# Patient Record
Sex: MALE | Race: WHITE | NOT HISPANIC OR LATINO | Employment: UNEMPLOYED | URBAN - METROPOLITAN AREA
[De-identification: names, ages, dates, MRNs, and addresses within clinical notes are randomized per-mention and may not be internally consistent; named-entity substitution may affect disease eponyms.]

---

## 2019-09-12 ENCOUNTER — TELEPHONE (OUTPATIENT)
Dept: NEUROLOGY | Facility: CLINIC | Age: 29
End: 2019-09-12

## 2019-09-12 NOTE — TELEPHONE ENCOUNTER
Left message regarding patient appointment on 10/1/2019 with Eboni Mckenzie    Please confirm with patient when call is return if they received NP packet, also please verify if patient had any former imaging,labs, EEG done or a  Neurologist they had seen in the past

## 2019-09-13 NOTE — TELEPHONE ENCOUNTER
Spoke to [employer] (mom) who explained that patient doesn't have h/o of seizures  Patient had a head injury years ago and would like another imaging done to make sure nothing is going on  Yuval Searcy is aware

## 2019-09-25 ENCOUNTER — TRANSCRIBE ORDERS (OUTPATIENT)
Dept: NEUROLOGY | Facility: CLINIC | Age: 29
End: 2019-09-25

## 2019-09-25 DIAGNOSIS — G40.901: Primary | ICD-10-CM

## 2019-09-25 DIAGNOSIS — F29 PSYCHOSIS NOT DUE TO SUBSTANCE OR KNOWN PHYSIOLOGICAL CONDITION (HCC): ICD-10-CM

## 2019-09-27 NOTE — PROGRESS NOTES
Patient ID: Alyssa Terry is a 26 y o  male with schizoaffective disorder with visual, auditory, and tactile hallucinations, who is presenting to Neurology office for evaluation of his hallucinations  Assessment/Plan:    Transient neurological symptoms  He has been having multiple neurologic symptoms including visual/auditory/tactile hallucinations  I discussed that his is likely related to his underlying psychiatric disease, but I cannot fully exclude the possibility of a neurologic cause, such as seizures without any additional testing  He has not been evaluated with an MRI in the past and has not had an EEG      -- to evaluate for the possibility of a structural lesion causing his symptoms and for seizures, I will have him get an MRI of his brain and an EEG  If these are normal, I do not feel any additional testing would be necessary  Schizoaffective disorder ([address] )  His mood and behaviors have been much better controlled with being on medications  He is having some cognitive slowing/side effects with his medications  I discussed that especially given how he was having difficulty with being violent, I would recommend that he discuss these symptoms with his psychiatrist          He will return to the office in about 3 months  Subjective:    HPI  Current medications as per Epic (not on any seizure medications)    Briefly reviewing his history, he has an extensive history of illicit substance abuse, initially abusing heroin, but later changing to methamphetamine  He also did abuse benzodiazepines in the past   Starting about 3 years ago, he started to have auditory hallucinations of hearing loud noises, that then changed to be voices  He also started to see movement and later formed visual hallucinations  Additionally he then started to have some tactile feelings such as bugs crawling on him or pushing on his head    Over the years, he has followed with a psychiatrist and has been on multiple medications  These medications have been helpful in decreasing his hallucinations, but they still occur  He also has had significant difficulty with violence and behavioral issues  This had escalated in the past where his family needed to call the police and have him incarcerated because of his symptoms  This violent behavior has overall improved significantly since he has been started on his multiple antipsychotic medications  In addition to his hallucinations, he does have significant difficulty with his thinking, feeling more cognitively slow  He also has some episodes where he will have brief twitching of his body, which is most common when he is falling asleep at night  He denies any larger definite seizures  He has not had any episodes where he has fully lost consciousness  He has "blacked out" during periods of time when he was in a "rage" and very violent, but this has not occurred in a long time since he has been on medications  Special Features  Status epilepticus: no  Self Injury Seizures: no  Precipitating Factors: none    Epilepsy Risk Factors:  Uncomplicated pregnancy with normal development  No learning disabilities or cognitive delay  No h/o febrile seizures, CNS infections, strokes, or CNS neoplasms  There is no family history of seizures or epilepsy  Prior AEDs:  None for seizures    Prior Evaluation:  - MRI brain: none  - Routine EEG: none  - Video EEG: none    Psychiatric History:  Depression: yes  Anxiety: yes  Psychosis: yes  Psychiatric Admissions: Yes    His history was also obtained from his mother, who was present at today's visit  I reviewed prior notes including hospital notes, as documented in Epic/[employer], and summarized above      The following portions of the patient's history were reviewed and updated as appropriate: allergies, current medications, past family history, past medical history, past social history, past surgical history and problem list  Objective:    Blood pressure 132/84, pulse 100, height 5' 11" (1 803 m), weight 89 4 kg (197 lb)  Physical Exam    Neurological Exam  GENERAL EXAMINATION:   In general patient is well appearing and in no distress  There is no peripheral edema  NEUROLOGIC EXAMINATION:     Alert and oriented to person, date, location  Fund of knowledge is full with good understanding of medical situation  Recent and remote memory were intact    Mood and affect are appropriate  Attention is intact  Language function including fluency, naming, and comprehension intact  Cranial nerves: Pupils are equal round reactive to light and accommodation  Visual Fields are full to confrontation bilaterally  Optic discs are sharp with no evidence of papilledema Extraocular movements are intact without nystagmus  Facial sensation is intact to light touch  No facial droop, face activates symmetrically  There is no dysarthria  Hearing was intact to finger rub  Tongue and uvula are midline and palate elevates symmetrically  Shoulder shrug  5/5  Motor Exam:  No pronator drift  Bulk and tone are normal  Strength is 5/5 throughout  Deep tendon reflexes: Biceps 2+, brachioradialis 2+, patellar 2+, Achilles 2+ bilaterally  Negative Madrids     Sensation: Intact light touch    Coordination: Finger nose finger and heel to shin testing are without dysmetria  Gait: Negative romberg  Normal casual gait  ROS:    Review of Systems   Constitutional: Positive for fatigue  Negative for appetite change and fever  HENT: Positive for tinnitus  Negative for hearing loss, trouble swallowing and voice change  Eyes: Negative  Negative for photophobia and pain  Respiratory: Negative  Negative for shortness of breath  Cardiovascular: Negative  Negative for palpitations  Gastrointestinal: Negative  Negative for nausea and vomiting  Endocrine: Negative  Negative for cold intolerance and heat intolerance     Genitourinary: Negative  Negative for dysuria, frequency and urgency  Musculoskeletal: Negative  Negative for myalgias and neck pain  Skin: Negative  Negative for rash  Allergic/Immunologic: Negative  Neurological: Negative for dizziness, tremors, seizures, syncope, facial asymmetry, speech difficulty, weakness, light-headedness, numbness and headaches  Trouble falling asleep  Memory problems   Hematological: Negative  Does not bruise/bleed easily  Psychiatric/Behavioral: Negative for confusion, hallucinations and sleep disturbance          Anxiety  Depression         I personally reviewed the ROS that was entered by the medical assistant

## 2019-10-01 ENCOUNTER — CONSULT (OUTPATIENT)
Dept: NEUROLOGY | Facility: CLINIC | Age: 29
End: 2019-10-01
Payer: COMMERCIAL

## 2019-10-01 VITALS
HEART RATE: 100 BPM | DIASTOLIC BLOOD PRESSURE: 84 MMHG | HEIGHT: 71 IN | WEIGHT: 197 LBS | SYSTOLIC BLOOD PRESSURE: 132 MMHG | BODY MASS INDEX: 27.58 KG/M2

## 2019-10-01 DIAGNOSIS — F29 PSYCHOSIS NOT DUE TO SUBSTANCE OR KNOWN PHYSIOLOGICAL CONDITION (HCC): ICD-10-CM

## 2019-10-01 DIAGNOSIS — G40.901: ICD-10-CM

## 2019-10-01 DIAGNOSIS — F25.9 SCHIZOAFFECTIVE DISORDER, UNSPECIFIED TYPE (HCC): Primary | ICD-10-CM

## 2019-10-01 DIAGNOSIS — R29.818 TRANSIENT NEUROLOGICAL SYMPTOMS: ICD-10-CM

## 2019-10-01 DIAGNOSIS — R44.3 HALLUCINATIONS: ICD-10-CM

## 2019-10-01 PROBLEM — S43.006A DISLOCATION, SHOULDER: Status: ACTIVE | Noted: 2019-10-01

## 2019-10-01 PROBLEM — E78.00 HIGH CHOLESTEROL: Status: ACTIVE | Noted: 2019-10-01

## 2019-10-01 PROCEDURE — 99245 OFF/OP CONSLTJ NEW/EST HI 55: CPT | Performed by: PSYCHIATRY & NEUROLOGY

## 2019-10-01 RX ORDER — LURASIDONE HYDROCHLORIDE 40 MG/1
TABLET, FILM COATED ORAL
Refills: 0 | COMMUNITY
Start: 2019-09-23 | End: 2021-02-11 | Stop reason: HOSPADM

## 2019-10-01 RX ORDER — BUPROPION HYDROCHLORIDE 300 MG/1
TABLET ORAL DAILY
Refills: 0 | COMMUNITY
Start: 2019-09-16 | End: 2021-07-09 | Stop reason: HOSPADM

## 2019-10-01 RX ORDER — ARIPIPRAZOLE 15 MG/1
15 TABLET ORAL DAILY
Refills: 0 | COMMUNITY
Start: 2019-09-06 | End: 2021-02-11 | Stop reason: HOSPADM

## 2019-10-01 RX ORDER — ATORVASTATIN CALCIUM 10 MG/1
10 TABLET, FILM COATED ORAL DAILY
COMMUNITY

## 2019-10-01 RX ORDER — BUSPIRONE HYDROCHLORIDE 15 MG/1
TABLET ORAL 3 TIMES DAILY
COMMUNITY
Start: 2018-07-16 | End: 2021-07-09 | Stop reason: HOSPADM

## 2019-10-11 LAB
BUN SERPL-MCNC: 14 MG/DL (ref 6–20)
CREAT SERPL-MCNC: 1.24 MG/DL (ref 0.76–1.27)
SL AMB EGFR AFRICAN AMERICAN: 90 ML/MIN/1.73
SL AMB EGFR NON AFRICAN AMERICAN: 78 ML/MIN/1.73

## 2019-10-14 NOTE — ASSESSMENT & PLAN NOTE
He has been having multiple neurologic symptoms including visual/auditory/tactile hallucinations  I discussed that his is likely related to his underlying psychiatric disease, but I cannot fully exclude the possibility of a neurologic cause, such as seizures without any additional testing  He has not been evaluated with an MRI in the past and has not had an EEG      -- to evaluate for the possibility of a structural lesion causing his symptoms and for seizures, I will have him get an MRI of his brain and an EEG  If these are normal, I do not feel any additional testing would be necessary

## 2019-10-14 NOTE — ASSESSMENT & PLAN NOTE
His mood and behaviors have been much better controlled with being on medications  He is having some cognitive slowing/side effects with his medications   I discussed that especially given how he was having difficulty with being violent, I would recommend that he discuss these symptoms with his psychiatrist

## 2019-10-22 ENCOUNTER — HOSPITAL ENCOUNTER (OUTPATIENT)
Dept: NEUROLOGY | Facility: HOSPITAL | Age: 29
Discharge: HOME | End: 2019-10-22
Attending: PSYCHIATRY & NEUROLOGY
Payer: COMMERCIAL

## 2019-10-22 DIAGNOSIS — R29.818 TRANSIENT NEUROLOGICAL SYMPTOMS: ICD-10-CM

## 2019-10-22 DIAGNOSIS — F29 PSYCHOSIS NOT DUE TO SUBSTANCE OR KNOWN PHYSIOLOGICAL CONDITION (HCC): ICD-10-CM

## 2019-10-22 DIAGNOSIS — R44.3 HALLUCINATIONS: ICD-10-CM

## 2019-10-22 DIAGNOSIS — F25.9 SCHIZOAFFECTIVE DISORDER, UNSPECIFIED TYPE (HCC): ICD-10-CM

## 2019-10-22 PROCEDURE — 95819 EEG AWAKE AND ASLEEP: CPT | Performed by: PSYCHIATRY & NEUROLOGY

## 2019-10-22 PROCEDURE — 95816 EEG AWAKE AND DROWSY: CPT

## 2020-01-06 RX ORDER — TRAZODONE HYDROCHLORIDE 50 MG/1
100 TABLET ORAL DAILY
Refills: 0 | COMMUNITY
Start: 2019-10-10 | End: 2021-07-09 | Stop reason: HOSPADM

## 2020-01-06 NOTE — PROGRESS NOTES
Patient ID: Argenis Choi is a 26 y o  male with schizoaffective disorder with visual, auditory, and tactile hallucinations, who is returning to Neurology office for follow up of his hallucinations  Assessment/Plan:    Hallucinations  As noted previously, his lucent lesions appear to be a manifestation of his schizoaffective disorder  His EEG did not show any evidence of seizures  The very nature of his symptoms, would be very atypical for seizures  Additionally, these have improved with adjustments in his psychiatric medications  It would be nice to be able to evaluate with an MRI of his brain, but this is likely not necessary to confirm his diagnosis  Will be most important for him to continue to establish care with a psychiatrist for ongoing management of his symptoms  Schizoaffective disorder ([facility] [address] )  As noted above, he is having some ongoing symptoms consistent with his schizoaffective disorder, and will be most important for him to follow with a psychiatrist for ongoing management  Memory loss  He has been having difficulty with poor memory  I discussed that the etiology of his memory difficulty is likely multifactorial, but largely likely tied to his schizoaffective disorder  Will be important to optimize his medications to improve his mood  I will check some blood work to assure there is no other cause of his memory difficulty  --I will have him get a B12, methylmalonic acid, TSH, and RPR checked        He will return to the office in about 6 months  Subjective:    HPI  Current medications as per Epic (not currently taking any seizure medications)  I last saw him in the office on 10/1/2019  At that time, he was seen as initial visit for evaluation of his hallucinations and to evaluate if there is any neurologic cause of his symptoms  To better characterize his episodes, he was plan on getting an MRI of his brain and a routine EEG      Since his last visit, he did get his routine EEG which was normal   He was not able to get his MRI due to insurance not approving the study  He is currently in the process of appealing the approval of his MRI  He has continued to follow with his primary care doctor for ongoing management of his schizoaffective disorder  He did have some adjustments of his medications, and is planned to establish with a new psychiatrist next week  He continues to have hallucinations, but no longer is hearing voices  He typically will hear a hissing or clicking sound, but if he talks to the hallucinations it will "talk back", baby is not able to make out the words  He does still have tactile hallucinations as if someone is pushing him and will see visual hallucinations of images in the peripheral vision, but this is not well formed and he is no longer seeing people  He feels that his mood is flat but good  He feels that his mood has been a little bit better  He has continued to have difficulty with his memory, noting very poor short-term memory  Prior Seizure Medications: none    His history was also obtained from his father, who was present at today's visit  The following portions of the patient's history were reviewed and updated as appropriate: allergies, current medications, past medical history and problem list      Objective:    Blood pressure 130/70, pulse 84, height 5' 11" (1 803 m), weight 93 kg (205 lb)  Physical Exam    Neurological Exam      ROS:    Review of Systems   Constitutional: Negative  Negative for appetite change and fever  HENT: Negative  Negative for hearing loss, tinnitus, trouble swallowing and voice change  Eyes: Negative  Negative for photophobia and pain  Respiratory: Negative  Negative for shortness of breath  Cardiovascular: Negative  Negative for palpitations  Gastrointestinal: Negative  Negative for nausea and vomiting  Endocrine: Negative  Negative for cold intolerance and heat intolerance  Genitourinary: Negative  Negative for dysuria, frequency and urgency  Musculoskeletal: Negative  Negative for myalgias and neck pain  Skin: Negative  Negative for rash  Neurological: Negative for dizziness, tremors, seizures, syncope, facial asymmetry, speech difficulty, weakness, light-headedness, numbness and headaches  Memory and concentration issues     Hematological: Negative  Does not bruise/bleed easily  Psychiatric/Behavioral: Positive for hallucinations  Negative for confusion and sleep disturbance         I personally reviewed the ROS that was entered by the medical assistant

## 2020-01-07 ENCOUNTER — OFFICE VISIT (OUTPATIENT)
Dept: NEUROLOGY | Facility: CLINIC | Age: 30
End: 2020-01-07
Payer: COMMERCIAL

## 2020-01-07 VITALS
SYSTOLIC BLOOD PRESSURE: 130 MMHG | WEIGHT: 205 LBS | DIASTOLIC BLOOD PRESSURE: 70 MMHG | BODY MASS INDEX: 28.7 KG/M2 | HEIGHT: 71 IN | HEART RATE: 84 BPM

## 2020-01-07 DIAGNOSIS — R41.3 MEMORY LOSS: ICD-10-CM

## 2020-01-07 DIAGNOSIS — F25.9 SCHIZOAFFECTIVE DISORDER, UNSPECIFIED TYPE (HCC): ICD-10-CM

## 2020-01-07 DIAGNOSIS — R44.3 HALLUCINATIONS: Primary | ICD-10-CM

## 2020-01-07 PROCEDURE — 99214 OFFICE O/P EST MOD 30 MIN: CPT | Performed by: PSYCHIATRY & NEUROLOGY

## 2020-01-07 RX ORDER — BUPROPION HYDROCHLORIDE 150 MG/1
150 TABLET ORAL DAILY
COMMUNITY
End: 2021-02-07 | Stop reason: SDUPTHER

## 2020-01-07 NOTE — PATIENT INSTRUCTIONS
-- I would like you to get some bloodwork to look into your memory  -- continue to work with your psychiatrist to try to improve your memory and hallucinations

## 2020-01-08 NOTE — ASSESSMENT & PLAN NOTE
As noted above, he is having some ongoing symptoms consistent with his schizoaffective disorder, and will be most important for him to follow with a psychiatrist for ongoing management

## 2020-01-08 NOTE — ASSESSMENT & PLAN NOTE
As noted previously, his lucent lesions appear to be a manifestation of his schizoaffective disorder  His EEG did not show any evidence of seizures  The very nature of his symptoms, would be very atypical for seizures  Additionally, these have improved with adjustments in his psychiatric medications  It would be nice to be able to evaluate with an MRI of his brain, but this is likely not necessary to confirm his diagnosis  Will be most important for him to continue to establish care with a psychiatrist for ongoing management of his symptoms

## 2020-01-08 NOTE — ASSESSMENT & PLAN NOTE
He has been having difficulty with poor memory  I discussed that the etiology of his memory difficulty is likely multifactorial, but largely likely tied to his schizoaffective disorder  Will be important to optimize his medications to improve his mood  I will check some blood work to assure there is no other cause of his memory difficulty      --I will have him get a B12, methylmalonic acid, TSH, and RPR checked

## 2020-07-25 ENCOUNTER — HOSPITAL ENCOUNTER (EMERGENCY)
Facility: HOSPITAL | Age: 30
Discharge: HOME | End: 2020-07-25
Attending: EMERGENCY MEDICINE
Payer: COMMERCIAL

## 2020-07-25 VITALS
HEART RATE: 81 BPM | SYSTOLIC BLOOD PRESSURE: 133 MMHG | RESPIRATION RATE: 18 BRPM | OXYGEN SATURATION: 98 % | BODY MASS INDEX: 22.32 KG/M2 | DIASTOLIC BLOOD PRESSURE: 85 MMHG | WEIGHT: 160 LBS | TEMPERATURE: 97.5 F

## 2020-07-25 DIAGNOSIS — F15.10 METHAMPHETAMINE ABUSE (HCC): ICD-10-CM

## 2020-07-25 DIAGNOSIS — R44.0 AUDITORY HALLUCINATIONS: Primary | ICD-10-CM

## 2020-07-25 LAB
AMPHETAMINES SERPL QL SCN: POSITIVE
BARBITURATES UR QL: NEGATIVE
BENZODIAZ UR QL: NEGATIVE
COCAINE UR QL: NEGATIVE
ETHANOL EXG-MCNC: 0 MG/DL
METHADONE UR QL: NEGATIVE
OPIATES UR QL SCN: NEGATIVE
OXYCODONE+OXYMORPHONE UR QL SCN: NEGATIVE
PCP UR QL: NEGATIVE
THC UR QL: NEGATIVE

## 2020-07-25 PROCEDURE — 99285 EMERGENCY DEPT VISIT HI MDM: CPT | Performed by: EMERGENCY MEDICINE

## 2020-07-25 PROCEDURE — 80307 DRUG TEST PRSMV CHEM ANLYZR: CPT | Performed by: EMERGENCY MEDICINE

## 2020-07-25 PROCEDURE — 99285 EMERGENCY DEPT VISIT HI MDM: CPT

## 2020-07-25 PROCEDURE — 82075 ASSAY OF BREATH ETHANOL: CPT | Performed by: EMERGENCY MEDICINE

## 2020-07-25 NOTE — DISCHARGE INSTRUCTIONS
Please follow-up with Psychiatry for further care, if symptoms worsen please return to emergency department    If you need further help with your methamphetamine abuse please reach out to your counselor at [facility] or return to the emergency department

## 2020-07-25 NOTE — ED NOTES
Pt denies SI/HI  States the hallucinations are just getting louder then normal  Pt states he recently had a change in his medications  Pt lives at home and family was concerned since he was talking about these hallucinations   The hallucinations are just "a bunch of chatter "     Kendra Padilla RN  07/25/20 [id number]

## 2020-07-25 NOTE — ED PROVIDER NOTES
History  Chief Complaint   Patient presents with    Hallucinations     pt c/o hallucinations, denies SI/HI     21-year-old male significant psychiatric history, history of ADHD, insomnia, drug abuse presents for evaluation of auditory hallucination  Denies SI, HI, Patient cooperative  Patient recently had evaluation by Neurology including EEG for possible seizures given his hallucinations however it was felt that they were manifestations of his schizoaffective disorder and were not consistent with seizure  Patient states that for last 3 and half years he has been having hallucinations, he has been admitted to [facility] [address] in the past and states that he does not want to go there  He states that he gets hallucinations which he describes as chattering and my head multiple times a week, sometimes he gets angry at them and tonight they were loud, keeping him from going to sleep so he got up and started yelling which caused his parents to call EMS to transport him to the hospital   Patient states he does not want to stay in the hospital, denies ever having command hallucinations  States that he has been taking his medications as prescribed  Denies any current drug use but does state that he used meth approximately 2 months ago  Denies any homicidal suicidal ideation  Denies any visual hallucinations  Denies any other medical complaints  He is alert and oriented x3, does not appear to be under the influence of drugs or alcohol and is able to provide a clear history of his condition  Of note initially patient denied doing methamphetamine for the last 2 months now states that he did methamphetamine this morning          Prior to Admission Medications   Prescriptions Last Dose Informant Patient Reported? Taking?    ARIPiprazole (ABILIFY) 15 mg tablet  Self Yes No   Sig: Take 15 mg by mouth daily   LATUDA 40 MG tablet  Self Yes No   Sig: Take by mouth daily at bedtime    atorvastatin (LIPITOR) 10 mg tablet  Self Yes No   Sig: Take 10 mg by mouth daily   buPROPion (WELLBUTRIN XL) 150 mg 24 hr tablet  Self Yes No   Sig: Take 150 mg by mouth daily   buPROPion (WELLBUTRIN XL) 300 mg 24 hr tablet  Self Yes No   busPIRone (BUSPAR) 15 mg tablet  Self Yes No   Sig: 3 (three) times a day    traZODone (DESYREL) 50 mg tablet  Self Yes No      Facility-Administered Medications: None       Past Medical History:   Diagnosis Date    ADD (attention deficit disorder)     Depression     [facility]     Insomnia        Past Surgical History:   Procedure Laterality Date    WISDOM TOOTH EXTRACTION         Family History   Problem Relation Age of Onset    Hypertension Mother     Hyperlipidemia Mother     Prostate cancer Father     Hyperlipidemia Father     Hypertension Maternal Grandmother     Uterine cancer Maternal Grandmother     Cancer Maternal Grandfather     Alcohol abuse Maternal Grandfather     Dementia Paternal Grandmother     Prostate cancer Paternal Grandfather     Lung cancer Paternal Grandfather     Seizures Neg Hx      I have reviewed and agree with the history as documented  E-Cigarette/Vaping    E-Cigarette Use Never User      E-Cigarette/Vaping Substances    Nicotine No     THC No     CBD No     Flavoring No     Other No     Unknown No      Social History     Tobacco Use    Smoking status: Current Every Day Smoker     Packs/day: 0 50     Years: 10 00     Pack years: 5 00     Types: Cigarettes    Smokeless tobacco: Never Used   Substance Use Topics    Alcohol use: No    Drug use: Not Currently     Types: Methamphetamines, Heroin       Review of Systems   Constitutional: Negative for appetite change, chills and fever  HENT: Negative for rhinorrhea and sore throat  Eyes: Negative for photophobia and visual disturbance  Respiratory: Negative for cough and shortness of breath  Cardiovascular: Negative for chest pain and palpitations     Gastrointestinal: Negative for abdominal pain and diarrhea  Genitourinary: Negative for dysuria, frequency and urgency  Skin: Negative for rash  Neurological: Negative for dizziness and weakness  Psychiatric/Behavioral: Positive for hallucinations  All other systems reviewed and are negative  Physical Exam  Physical Exam   Constitutional: He is oriented to person, place, and time  He appears well-developed and well-nourished  HENT:   Head: Normocephalic and atraumatic  Right Ear: External ear normal    Left Ear: External ear normal    Mouth/Throat: Oropharynx is clear and moist    Eyes: Pupils are equal, round, and reactive to light  Conjunctivae and EOM are normal    Neck: Normal range of motion  Neck supple  No JVD present  No tracheal deviation present  Cardiovascular: Normal rate, regular rhythm and normal heart sounds  Exam reveals no gallop and no friction rub  No murmur heard  Pulmonary/Chest: Effort normal  No stridor  No respiratory distress  He has no wheezes  He has no rales  Abdominal: Soft  He exhibits no distension and no mass  There is no tenderness  There is no rebound and no guarding  Musculoskeletal: Normal range of motion  He exhibits no edema  Neurological: He is alert and oriented to person, place, and time  No cranial nerve deficit  Skin: Skin is warm and dry  No rash noted  No erythema  No pallor  Psychiatric: He expresses no homicidal and no suicidal ideation  He expresses no suicidal plans and no homicidal plans  Nursing note and vitals reviewed        Vital Signs  ED Triage Vitals [07/25/20 0037]   Temperature Pulse Respirations Blood Pressure SpO2   97 5 °F (36 4 °C) 81 18 133/85 98 %      Temp Source Heart Rate Source Patient Position - Orthostatic VS BP Location FiO2 (%)   Temporal Monitor Lying Right arm --      Pain Score       --           Vitals:    07/25/20 0037   BP: 133/85   Pulse: 81   Patient Position - Orthostatic VS: Lying         Visual Acuity      ED Medications  Medications - No data to display    Diagnostic Studies  Results Reviewed     Procedure Component Value Units Date/Time    Rapid drug screen, urine [[id number]]  (Abnormal) Collected:  07/25/20 0122    Lab Status:  Final result Specimen:  Urine, Other Updated:  07/25/20 0200     Amph/Meth UR Positive     Barbiturate Ur Negative     Benzodiazepine Urine Negative     Cocaine Urine Negative     Methadone Urine Negative     Opiate Urine Negative     PCP Ur Negative     THC Urine Negative     Oxycodone Urine Negative    Narrative:       FOR MEDICAL PURPOSES ONLY  IF CONFIRMATION NEEDED PLEASE CONTACT THE LAB WITHIN 5 DAYS  Drug Screen Cutoff Levels:  AMPHETAMINE/METHAMPHETAMINES  1000 ng/mL  BARBITURATES     200 ng/mL  BENZODIAZEPINES     200 ng/mL  COCAINE      300 ng/mL  METHADONE      300 ng/mL  OPIATES      300 ng/mL  PHENCYCLIDINE     25 ng/mL  THC       50 ng/mL  OXYCODONE      100 ng/mL    POCT alcohol breath test [[id number]]  (Normal) Resulted:  07/25/20 0122    Lab Status:  Final result Updated:  07/25/20 0122     EXTBreath Alcohol 0 000                 No orders to display              Procedures  Procedures         ED Course  ED Course as of Jul 25 0210   Sat Jul 25, 2020   0054 Further history was provided by patient's mother who he currently lives with  He states that he has had these issues for last 4 years and insists that it started prior to his meth use  He has an appointment at [facility] behavioral [facility] in August and currently under the care of Amber Moscoso,   He was 302d multiple times, most recently in June, wasn't eating wasn't bathing, hearing voices  She states that he has been having worsening ever since his discharge from psych facility  hallucinations , tonight was screaming and arguing with the voices, banging on things around the house   Has been saying for the last couple of weeks that he wants to kill his ex-girlfriend's father who he attributes his voices to , has been saying this for 2 weeks  The father lives far away and he has no access to car, weapons, etc  Today he was banging and 'hitting stuff up in his room' accused his father of paying 50k$ to the exgirlfriends father   Nery however he did not make threatening gestures or attempted to hurt his parents  His mom also states that he has never hurt them in the past   June last 302 [facility] was there for 7 days  Has been in contact with his counselor [facility]  [phone] Offered patient a inpatient psychiatric treatment given his continued hallucination however patient states that he just recently got discharged from [address] [address], has been taking his medications and does not feel that  that he needs to be admitted again, he does have methamphetamine in his urine now states he used this morning  Currently calm, cooperative, does not appear to be a danger to self or other, I do not have grounds to file a 302, will call parents and discuss      0206 Discussed the case with patient's mother  Angel Fuentes, she feels comfortable coming to pick him up at this time, he does have an appointment with [facility] for further evaluation, she will also speak with his [facility]  MDM  Number of Diagnoses or Management Options  Diagnosis management comments: 67-year-old male past medical history of depression, ADHD presents for hallucinations  No medical complaints  Patient does not have any suicidal homicidal ideation, does not want to stay for further treatment, patient gives permission to speak to his parents regarding his current situation, will call parents and discuss patient case        Disposition  Final diagnoses:    Auditory hallucinations   Methamphetamine abuse ([facility] )     Time reflects when diagnosis was documented in both MDM as applicable and the Disposition within this note     Time User Action Codes Description Comment 7/25/2020  2:09 AM Evan Oar Add [R44 0] Auditory hallucinations     7/25/2020  2:10 AM Evan Oar Add [F15 10] Methamphetamine abuse [facility])       ED Disposition     ED Disposition Condition Date/Time Comment    Discharge Stable Sat Jul 25, 2020  2:09 AM Mamie Perkins discharge to home/self care  Follow-up Information     Follow up With Specialties Details Why Contact Info Additional Information    Giovana Pelletier MD Internal Medicine Schedule an appointment as soon as possible for a visit   [address] [phone]        [address] Emergency Department Emergency Medicine  If symptoms worsen [address] [place], [address]  [phone]  ED, [address] [place] [address], [facility] [address]          Patient's Medications   Discharge Prescriptions    No medications on file     No discharge procedures on file      PDMP Review     None          ED Provider  Electronically Signed by           Harley Lamb MD  07/25/20 5946

## 2020-08-05 ENCOUNTER — HOSPITAL ENCOUNTER (EMERGENCY)
Facility: HOSPITAL | Age: 30
End: 2020-08-06
Attending: EMERGENCY MEDICINE | Admitting: EMERGENCY MEDICINE
Payer: COMMERCIAL

## 2020-08-05 VITALS
HEART RATE: 69 BPM | DIASTOLIC BLOOD PRESSURE: 74 MMHG | OXYGEN SATURATION: 99 % | SYSTOLIC BLOOD PRESSURE: 133 MMHG | TEMPERATURE: 97 F | RESPIRATION RATE: 16 BRPM

## 2020-08-05 DIAGNOSIS — R45.851 SUICIDAL IDEATION: Primary | ICD-10-CM

## 2020-08-05 LAB
AMPHETAMINES SERPL QL SCN: POSITIVE
BARBITURATES UR QL: NEGATIVE
BENZODIAZ UR QL: POSITIVE
COCAINE UR QL: NEGATIVE
ETHANOL EXG-MCNC: 0 MG/DL
METHADONE UR QL: NEGATIVE
OPIATES UR QL SCN: NEGATIVE
OXYCODONE+OXYMORPHONE UR QL SCN: NEGATIVE
PCP UR QL: NEGATIVE
SARS-COV-2 RNA RESP QL NAA+PROBE: NEGATIVE
THC UR QL: NEGATIVE

## 2020-08-05 PROCEDURE — 99285 EMERGENCY DEPT VISIT HI MDM: CPT

## 2020-08-05 PROCEDURE — 87635 SARS-COV-2 COVID-19 AMP PRB: CPT | Performed by: EMERGENCY MEDICINE

## 2020-08-05 PROCEDURE — 80307 DRUG TEST PRSMV CHEM ANLYZR: CPT | Performed by: EMERGENCY MEDICINE

## 2020-08-05 PROCEDURE — 82075 ASSAY OF BREATH ETHANOL: CPT | Performed by: EMERGENCY MEDICINE

## 2020-08-05 PROCEDURE — 99285 EMERGENCY DEPT VISIT HI MDM: CPT | Performed by: EMERGENCY MEDICINE

## 2020-08-05 NOTE — ED NOTES
Call placed to [facility], spoke with Cathy Junior who reports bed availability; chart faxed for review       BAILEE Maravilla  08/05/20   2799

## 2020-08-05 NOTE — ED NOTES
Pt is a 31 y o  male who was brought to the ED per the recommendation of his  due to ongoing command auditory hallucinations  Patient states that he has experienced auditory hallucinations for about 4 years, however they have evolved from being 'chatter' to telling him to hurt himself  Patient reports experiencing [facility] daily, but denies the voices telling him how to hurt himself  Patient denies any prior suicide attempts  He denies homicidal ideations or that the voices tell him to hurt anyone else  He denies any visual hallucinations  Patient reports some instances of irritability because the voices are so persistent and annoying  Patient has received inpatient mental health treatment and D&A treatment in the past, but is a poor historian  He relates most recently being admitted at [address] [place] [address], but doesn't feel it was helpful  Patient has current outpatient treatment through [facility], and reports therapy weekly  He reports taking his prescribed medications appropriately  Patient has a history of meth use, for the past several years, on and off  He states that he was recently sober until about a month ago  He is currently using 1-2 x per week via IV  Patient could not provide additional details into amount/frequency of use  He denies any other drug or alcohol use  Patient denies any issues with sleep but reports poor appetite for the past couple of days  Patient feels that he would benefit from inpatient treatment at this time and is requesting [facility]  be the first option for placement  Chief Complaint   Patient presents with    Hallucinations     pt states he is hearing voices, states its mostly chatter, no commands   patient denies SI/HI      Intake Assessment completed, Safety risk Assessment completed    BAILEE Robles  08/05/20   2605

## 2020-08-05 NOTE — ED PROVIDER NOTES
History  Chief Complaint   Patient presents with    Hallucinations     pt states he is hearing voices, states its mostly chatter, no commands  patient denies SI/HI      50-year-old male presents for evaluation of command hallucinations of suicidal thoughts  Patient denies any specific plans or commands to hurt himself  Last loosen a shin was approximately 2 hours ago  Patient also reports abusing methamphetamine this morning  Patient states he has been compliant with his psychiatric medications including Wellbutrin, Latuda, Klonopin, trazodone  Patient denies any further complaints at time including homicidal ideations, chest pain, shortness of breath, vomiting, abdominal pain  Prior to Admission Medications   Prescriptions Last Dose Informant Patient Reported? Taking?    ARIPiprazole (ABILIFY) 15 mg tablet Not Taking at Unknown time Self Yes No   Sig: Take 15 mg by mouth daily   LATUDA 40 MG tablet  Self Yes No   Sig: Take by mouth daily at bedtime    atorvastatin (LIPITOR) 10 mg tablet Not Taking at Unknown time Self Yes No   Sig: Take 10 mg by mouth daily   buPROPion (WELLBUTRIN XL) 150 mg 24 hr tablet Not Taking at Unknown time Self Yes No   Sig: Take 150 mg by mouth daily   buPROPion (WELLBUTRIN XL) 300 mg 24 hr tablet  Self Yes No   Sig: daily    busPIRone (BUSPAR) 15 mg tablet  Self Yes No   Sig: 3 (three) times a day    clonazePAM (KLONOPIN PO)   Yes Yes   Sig: Take 3 mg by mouth 3 (three) times a day   traZODone (DESYREL) 50 mg tablet  Self Yes No      Facility-Administered Medications: None       Past Medical History:   Diagnosis Date    ADD (attention deficit disorder)     Depression     Drug abuse (HCC)     Insomnia     Schizophrenia ([facility] [address] )        Past Surgical History:   Procedure Laterality Date    WISDOM TOOTH EXTRACTION         Family History   Problem Relation Age of Onset    Hypertension Mother     Hyperlipidemia Mother     Prostate cancer Father     Hyperlipidemia Father  Hypertension Maternal Grandmother     Uterine cancer Maternal Grandmother     Cancer Maternal Grandfather     Alcohol abuse Maternal Grandfather     Dementia Paternal Grandmother     Prostate cancer Paternal Grandfather     Lung cancer Paternal Grandfather     Seizures Neg Hx      I have reviewed and agree with the history as documented  E-Cigarette/Vaping    E-Cigarette Use Never User      E-Cigarette/Vaping Substances    Nicotine No     THC No     CBD No     Flavoring No     Other No     Unknown No      Social History     Tobacco Use    Smoking status: Current Every Day Smoker     Packs/day: 0 50     Years: 10 00     Pack years: 5 00     Types: Cigarettes    Smokeless tobacco: Never Used   Substance Use Topics    Alcohol use: No    Drug use: Not Currently     Types: Methamphetamines, Heroin     Comment: last use of meth was this morning        Review of Systems   Constitutional: Negative for chills, diaphoresis and fever  HENT: Negative for congestion and rhinorrhea  Eyes: Negative for pain and visual disturbance  Respiratory: Negative for cough, shortness of breath and wheezing  Cardiovascular: Negative for chest pain and leg swelling  Gastrointestinal: Negative for abdominal pain, diarrhea, nausea and vomiting  Genitourinary: Negative for difficulty urinating, dysuria, frequency and urgency  Musculoskeletal: Negative for back pain and neck pain  Skin: Negative for color change and rash  Neurological: Negative for syncope, numbness and headaches  Psychiatric/Behavioral: Positive for hallucinations and suicidal ideas  All other systems reviewed and are negative  Physical Exam  Physical Exam  Vitals signs and nursing note reviewed  Constitutional:       Appearance: He is well-developed  HENT:      Head: Normocephalic and atraumatic  Eyes:      Conjunctiva/sclera: Conjunctivae normal    Neck:      Musculoskeletal: Normal range of motion and neck supple  Cardiovascular:      Rate and Rhythm: Normal rate and regular rhythm  Pulmonary:      Effort: Pulmonary effort is normal  No respiratory distress  Abdominal:      Palpations: Abdomen is soft  Tenderness: There is no abdominal tenderness  Musculoskeletal: Normal range of motion  General: No tenderness  Skin:     General: Skin is warm  Findings: No erythema  Neurological:      Mental Status: He is alert and oriented to person, place, and time  Psychiatric:         Behavior: Behavior normal          Vital Signs  ED Triage Vitals   Temperature Pulse Respirations Blood Pressure SpO2   08/05/20 1649 08/05/20 1649 08/05/20 1649 08/05/20 1649 08/05/20 1649   97 9 °F (36 6 °C) 87 17 143/88 96 %      Temp Source Heart Rate Source Patient Position - Orthostatic VS BP Location FiO2 (%)   08/05/20 1807 08/05/20 1649 08/05/20 1649 08/05/20 1649 --   Temporal Monitor Lying Right arm       Pain Score       --                  Vitals:    08/05/20 1649 08/05/20 1807 08/05/20 1902   BP: 143/88 132/78 133/74   Pulse: 87 78 69   Patient Position - Orthostatic VS: Lying Lying Lying         Visual Acuity      ED Medications  Medications - No data to display    Diagnostic Studies  Results Reviewed     Procedure Component Value Units Date/Time    Rapid drug screen, urine [[id number]]  (Abnormal) Collected:  08/05/20 1705    Lab Status:  Final result Specimen:  Urine, Clean Catch Updated:  08/05/20 2041     Amph/Meth UR Positive     Barbiturate Ur Negative     Benzodiazepine Urine Positive     Cocaine Urine Negative     Methadone Urine Negative     Opiate Urine Negative     PCP Ur Negative     THC Urine Negative     Oxycodone Urine Negative    Narrative:       Presumptive report  If requested, specimen will be sent to reference lab for confirmation  FOR MEDICAL PURPOSES ONLY  IF CONFIRMATION NEEDED PLEASE CONTACT THE LAB WITHIN 5 DAYS      Drug Screen Cutoff Levels:  AMPHETAMINE/METHAMPHETAMINES  1000 ng/mL  BARBITURATES     200 ng/mL  BENZODIAZEPINES     200 ng/mL  COCAINE      300 ng/mL  METHADONE      300 ng/mL  OPIATES      300 ng/mL  PHENCYCLIDINE     25 ng/mL  THC       50 ng/mL  OXYCODONE      100 ng/mL    Novel Coronavirus (Covid-19),PCR [facility] [[id number]]  (Normal) Collected:  08/05/20 1731    Lab Status:  Final result Specimen:  Nares from Nose Updated:  08/05/20 1836     SARS-CoV-2 Negative    Narrative: The specimen collection materials, transport medium, and/or testing methodology utilized in the production of these test results have been proven to be reliable in a limited validation with an abbreviated program under the Emergency Utilization Authorization provided by the FDA  Testing reported as "Presumptive positive" will be confirmed with secondary testing with a reference laboratory to ensure result accuracy  Clinical caution and judgement should be used with the interpretation of these results with consideration of the clinical impression and other laboratory testing  Testing reported as "Positive" or "Negative" has been proven to be accurate according to standard laboratory validation requirements  All testing is performed with control materials showing appropriate reactivity at standard intervals  POCT alcohol breath test [[id number]]  (Normal) Resulted:  08/05/20 1705    Lab Status:  Final result Updated:  08/05/20 1705     EXTBreath Alcohol 0 000                 No orders to display              Procedures  Procedures         ED Course       US AUDIT      Most Recent Value   Initial Alcohol Screen: US AUDIT-C    1  How often do you have a drink containing alcohol?  0 Filed at: 08/05/2020 1653   2  How many drinks containing alcohol do you have on a typical day you are drinking? 0 Filed at: 08/05/2020 1653   3a  Male UNDER 65: How often do you have five or more drinks on one occasion? 0 Filed at: 08/05/2020 1653   3b  FEMALE Any Age, or MALE 65+:  How often do you have 4 or more drinks on one occassion? 0 Filed at: 08/05/2020 1653   Audit-C Score  0 Filed at: 08/05/2020 1653                  COLLEEN/DAST-10      Most Recent Value   How many times in the past year have you    Used an illegal drug or used a prescription medication for non-medical reasons? Weekly Filed at: 08/05/2020 1730   In the past 12 months      1  Have you used drugs other than those required for medical reasons? 1 Filed at: 08/05/2020 1730   2  Do you use more than one drug at a time? 0 Filed at: 08/05/2020 1730   3  Have you had medical problems as a result of your drug use (e g , memory loss, hepatitis, convulsions, bleeding, etc )? 1 Filed at: 08/05/2020 1730   4  Have you had "blackouts" or "flashbacks" as a result of drug use? YesNo  1 Filed at: 08/05/2020 1730   5  Do you ever feel bad or guilty about your drug use? 0 Filed at: 08/05/2020 1730   6  Does your spouse (or parent) ever complain about your involvement with drugs? 0 Filed at: 08/05/2020 1730   7  Have you neglected your family because of your use of drugs? 1 Filed at: 08/05/2020 1730   8  Have you engaged in illegal activities in order to obtain drugs? 1 Filed at: 08/05/2020 1730   9  Have you ever experienced withdrawal symptoms (felt sick) when you stopped taking drugs? 1 Filed at: 08/05/2020 1730   10  Are you always able to stop using drugs when you want to? 1 Filed at: 08/05/2020 1730   DAST-10 Score  (!) 7 Filed at: 08/05/2020 1730                                MDM  Number of Diagnoses or Management Options  Diagnosis management comments: 78-year-old male presenting with commands suicidal hallucinations  Patient would like to sign a 201 for voluntary treatment          Disposition  Final diagnoses:   Suicidal ideation     Time reflects when diagnosis was documented in both MDM as applicable and the Disposition within this note     Time User Action Codes Description Comment    8/5/2020 10:20 PM Juanis Concepcion Add [R38 394] Suicidal ideation       ED Disposition     ED Disposition Condition Date/Time Comment    Transfer to [facility] 7066 Aug 5, 2020 10:20 PM Del Pineda should be transferred out to [facility] and has been medically cleared          MD Documentation      Most Recent Value   Patient Condition  The patient has been stabilized such that within reasonable medical probability, no material deterioration of the patient condition or the condition of the unborn child(tarun) is likely to result from the transfer   Reason for Transfer  Level of Care needed not available at this facility   Benefits of Transfer  Other benefits (Include comment)_______________________ Nicole Silence MH tx]   Risks of Transfer  Potential for delay in receiving treatment   Accepting Physician  Dr Rosado [facility] Name, [address] [place] [address]    (Name & Tel number)  BAILEE Briones   Transported by (Company and Unit #)  Osbal[facility] Ivory   Sending MD Dr Hitesh Harvey   Provider Certification  General risk, such as traffic hazards, adverse weather conditions, rough terrain or turbulence, possible failure of equipment (including vehicle or aircraft), or consequences of actions of persons outside the control of the transport personnel      RN Documentation      [address] Name, [address] [facility]    (Name & Tel number)  BAILEE Briones   Transport Mode  Ambulance   [facility]   Level of Care  Basic life support      Follow-up Information    None         Discharge Medication List as of 8/6/2020 12:53 AM      CONTINUE these medications which have NOT CHANGED    Details   clonazePAM (KLONOPIN PO) Take 3 mg by mouth 3 (three) times a day, Historical Med      ARIPiprazole (ABILIFY) 15 mg tablet Take 15 mg by mouth daily, Starting Fri 9/6/2019, Historical Med      atorvastatin (LIPITOR) 10 mg tablet Take 10 mg by mouth daily, Historical Med      !! buPROPion (WELLBUTRIN XL) 150 mg 24 hr tablet Take 150 mg by mouth daily, Historical Med      !! buPROPion (WELLBUTRIN XL) 300 mg 24 hr tablet daily , Starting Mon 9/16/2019, Historical Med      busPIRone (BUSPAR) 15 mg tablet 3 (three) times a day , Starting Mon 7/16/2018, Historical Med      Nova Ione 40 MG tablet Take by mouth daily at bedtime , Starting Mon 9/23/2019, Historical Med      traZODone (DESYREL) 50 mg tablet Starting Thu 10/10/2019, Historical Med       !! - Potential duplicate medications found  Please discuss with provider  No discharge procedures on file      PDMP Review     None          ED Provider  Electronically Signed by           Carolann Dunham DO  08/06/20 9113

## 2020-08-06 NOTE — ED NOTES
Insurance Authorization for admission:   Phone call placed to [email or web address]  Phone number: [phone]  Spoke to GETA  3 days approved  Level of care: Acute Inpt  (201)  Review on 08/08/2020  Authorization # To be obtained by accepting facility upon arrival         EVS (Eligibility Verification System) called - [phone]  Automated system indicates: Active with [address] [place] [address] for Transportation:    Transportation is being arranged through [employer]       BAILEE Escamilla  08/05/20   5495

## 2020-08-06 NOTE — EMTALA/ACUTE CARE TRANSFER
[facility] EMERGENCY DEPARTMENT  [address]   [facility] [address]  Dept: [phone]      EMTALA TRANSFER CONSENT    NAME Yolanda Bills                                         1990                              MRN [id number]    I have been informed of my rights regarding examination, treatment, and transfer   by Dr Ember Hill DO    Benefits: Other benefits (Include comment)_______________________(Inpt MH tx)    Risks: Potential for delay in receiving treatment      Transfer Request   I acknowledge that my medical condition has been evaluated and explained to me by the emergency department physician or other qualified medical person and/or my attending physician who has recommended and offered to me further medical examination and treatment  I understand the Hospital's obligation with respect to the treatment and stabilization of my emergency medical condition  I nevertheless request to be transferred  I release the Hospital, the doctor, and any other persons caring for me from all responsibility or liability for any injury or ill effects that may result from my transfer and agree to accept all responsibility for the consequences of my choice to transfer, rather than receive stabilizing treatment at the Hospital  I understand that because the transfer is my request, my insurance may not provide reimbursement for the services  The Hospital will assist and direct me and my family in how to make arrangements for transfer, but the hospital is not liable for any fees charged by the transport service  In spite of this understanding, I refuse to consent to further medical examination and treatment which has been offered to me, and request transfer to  [address] Name, [facility] State : [facility]  I authorize the performance of emergency medical procedures and treatments upon me in both transit and upon arrival at the receiving facility  Additionally, I authorize the release of any and all medical records to the receiving facility and request they be transported with me, if possible  I authorize the performance of emergency medical procedures and treatments upon me in both transit and upon arrival at the receiving facility  Additionally, I authorize the release of any and all medical records to the receiving facility and request they be transported with me, if possible  I understand that the safest mode of transportation during a medical emergency is an ambulance and that the Hospital advocates the use of this mode of transport  Risks of traveling to the receiving facility by car, including absence of medical control, life sustaining equipment, such as oxygen, and medical personnel has been explained to me and I fully understand them  (NEGRA CORRECT BOX BELOW)  [  ]  I consent to the stated transfer and to be transported by ambulance/helicopter  [  ]  I consent to the stated transfer, but refuse transportation by ambulance and accept full responsibility for my transportation by car  I understand the risks of non-ambulance transfers and I exonerate the Hospital and its staff from any deterioration in my condition that results from this refusal     X___________________________________________    DATE  20  TIME________  Signature of patient or legally responsible individual signing on patient behalf           RELATIONSHIP TO PATIENT_________________________          Provider Certification    NAME Carlos Kauffman                                         1990                              MRN [id number]    A medical screening exam was performed on the above named patient  Based on the examination:    Condition Necessitating Transfer The encounter diagnosis was Suicidal ideation      Patient Condition: The patient has been stabilized such that within reasonable medical probability, no material deterioration of the patient condition or the condition of the unborn child(tarun) is likely to result from the transfer    Reason for Transfer: Level of Care needed not available at this facility    Transfer Requirements: Gerson Jordan PA   · Space available and qualified personnel available for treatment as acknowledged by BAILEE Knott  · Agreed to accept transfer and to provide appropriate medical treatment as acknowledged by       Dr Sweetie Beckford  · Appropriate medical records of the examination and treatment of the patient are provided at the time of transfer   [address] [facility], [address] _______  · Transfer will be performed by qualified personnel from [place] [address]   and appropriate transfer equipment as required, including the use of necessary and appropriate life support measures  Provider Certification: I have examined the patient and explained the following risks and benefits of being transferred/refusing transfer to the patient/family:  General risk, such as traffic hazards, adverse weather conditions, rough terrain or turbulence, possible failure of equipment (including vehicle or aircraft), or consequences of actions of persons outside the control of the transport personnel      Based on these reasonable risks and benefits to the patient and/or the unborn child(tarun), and based upon the information available at the time of the patients examination, I certify that the medical benefits reasonably to be expected from the provision of appropriate medical treatments at another medical facility outweigh the increasing risks, if any, to the individuals medical condition, and in the case of labor to the unborn child, from effecting the transfer      X____________________________________________ DATE 08/05/20        TIME_______      ORIGINAL - SEND TO MEDICAL RECORDS   COPY - SEND WITH PATIENT DURING TRANSFER

## 2020-08-06 NOTE — ED NOTES
[facility] is [facility]  Patient is accepted by Dr Ebony Fulton per [facility] in admissions  Transportation is being arranged through Devyn Grewal  They will call back once transportation scheduled  Patient may go to the floor at anytime  Nurse report is not needed      BAILEE Rosales  08/05/20   5072

## 2020-08-06 NOTE — ED CARE HANDOFF
Emergency Department Sign Out Note        Sign out and transfer of care from Dr Marjorie Diaz  See Separate Emergency Department note  The patient, Mamie Perkins, was evaluated by the previous provider for suicidal ideation  Workup Completed:  Labs Reviewed   RAPID DRUG SCREEN, URINE - Abnormal       Result Value Ref Range Status    Amph/Meth UR Positive (*) Negative Final    Barbiturate Ur Negative  Negative Final    Benzodiazepine Urine Positive (*) Negative Final    Cocaine Urine Negative  Negative Final    Methadone Urine Negative  Negative Final    Opiate Urine Negative  Negative Final    PCP Ur Negative  Negative Final    THC Urine Negative  Negative Final    Oxycodone Urine Negative  Negative Final    Narrative:     Presumptive report  If requested, specimen will be sent to reference lab for confirmation  FOR MEDICAL PURPOSES ONLY  IF CONFIRMATION NEEDED PLEASE CONTACT THE LAB WITHIN 5 DAYS  Drug Screen Cutoff Levels:  AMPHETAMINE/METHAMPHETAMINES  1000 ng/mL  BARBITURATES     200 ng/mL  BENZODIAZEPINES     200 ng/mL  COCAINE      300 ng/mL  METHADONE      300 ng/mL  OPIATES      300 ng/mL  PHENCYCLIDINE     25 ng/mL  THC       50 ng/mL  OXYCODONE      100 ng/mL   NOVEL CORONAVIRUS (COVID-19), PCR [facility] - Normal    SARS-CoV-2 Negative  Negative Final    Narrative: The specimen collection materials, transport medium, and/or testing methodology utilized in the production of these test results have been proven to be reliable in a limited validation with an abbreviated program under the Emergency Utilization Authorization provided by the FDA  Testing reported as "Presumptive positive" will be confirmed with secondary testing with a reference laboratory to ensure result accuracy  Clinical caution and judgement should be used with the interpretation of these results with consideration of the clinical impression and other laboratory testing    Testing reported as "Positive" or "Negative" has been proven to be accurate according to standard laboratory validation requirements  All testing is performed with control materials showing appropriate reactivity at standard intervals  POCT ALCOHOL BREATH TEST - Normal    EXTBreath Alcohol 0 000   Final      No orders to display        ED Course / Workup Pending (followup):                            ED Course as of Aug 05 2220   Wed Aug 05, 2020   2207 Pt signed out from Dr Marjorie Diaz  Pt, suicidal  Is going to [address] @ 12:30  Procedures  MDM  Number of Diagnoses or Management Options  Suicidal ideation: new and requires workup     Amount and/or Complexity of Data Reviewed  Clinical lab tests: reviewed  Tests in the medicine section of CPT®: reviewed  Discussion of test results with the performing providers: yes    Risk of Complications, Morbidity, and/or Mortality  Presenting problems: moderate  Diagnostic procedures: low  Management options: low    Patient Progress  Patient progress: stable      Disposition  Final diagnoses:   Suicidal ideation     Time reflects when diagnosis was documented in both MDM as applicable and the Disposition within this note     Time User Action Codes Description Comment    8/5/2020 10:20 PM Tiffani Rose Add [P88 [id number]] Suicidal ideation       ED Disposition     ED Disposition Condition Date/Time Comment    Transfer to [facility] 7066 Aug 5, 2020 10:20 PM Mamie Perkins should be transferred out to [facility] and has been medically cleared          MD Documentation      Most Recent Value   Patient Condition  The patient has been stabilized such that within reasonable medical probability, no material deterioration of the patient condition or the condition of the unborn child(tarun) is likely to result from the transfer   Reason for Transfer  Level of Care needed not available at this facility   Benefits of Transfer  Other benefits (Include comment)_______________________ Jenna Bumpers MH tx]   Risks of Transfer  Potential for delay in receiving treatment   Accepting Physician  Dr Lam Card Name, [address] [facility]    (Name & Tel number)  BAILEE Aguayo   Transported by (Company and Unit #)  Darrian Ji   Provider Certification  General risk, such as traffic hazards, adverse weather conditions, rough terrain or turbulence, possible failure of equipment (including vehicle or aircraft), or consequences of actions of persons outside the control of the transport personnel      RN Documentation      [address] Name, [address] [facility]    (Name & Tel number)  BAILEE Aguayo   Transport Mode  Ambulance   Transported by [employer] and Unit #)  [facility]   Level of Care  Basic life support      Follow-up Information    None       Patient's Medications   Discharge Prescriptions    No medications on file     No discharge procedures on file         ED Provider  Electronically Signed by     Vandana Goncalves MD  08/05/20 9674

## 2020-08-06 NOTE — ED NOTES
Transportation scheduled with [facility] for [address] [facility]       BAILEE Leigh  08/05/20   8549

## 2021-02-07 ENCOUNTER — HOSPITAL ENCOUNTER (EMERGENCY)
Facility: HOSPITAL | Age: 31
End: 2021-02-08
Attending: EMERGENCY MEDICINE | Admitting: EMERGENCY MEDICINE
Payer: COMMERCIAL

## 2021-02-07 DIAGNOSIS — F15.10 METHAMPHETAMINE USE (HCC): ICD-10-CM

## 2021-02-07 DIAGNOSIS — Z91.14 NONCOMPLIANCE WITH MEDICATIONS: ICD-10-CM

## 2021-02-07 DIAGNOSIS — F22 PARANOID DELUSION (HCC): Primary | ICD-10-CM

## 2021-02-07 PROBLEM — M75.91 SHOULDER LESION, RIGHT: Status: ACTIVE | Noted: 2018-08-29

## 2021-02-07 LAB
AMPHETAMINES SERPL QL SCN: POSITIVE
BARBITURATES UR QL: NEGATIVE
BENZODIAZ UR QL: NEGATIVE
COCAINE UR QL: NEGATIVE
ETHANOL EXG-MCNC: 0 MG/DL
FLUAV RNA RESP QL NAA+PROBE: NEGATIVE
FLUBV RNA RESP QL NAA+PROBE: NEGATIVE
METHADONE UR QL: NEGATIVE
OPIATES UR QL SCN: NEGATIVE
OXYCODONE+OXYMORPHONE UR QL SCN: NEGATIVE
PCP UR QL: NEGATIVE
RSV RNA RESP QL NAA+PROBE: NEGATIVE
SARS-COV-2 RNA RESP QL NAA+PROBE: NEGATIVE
THC UR QL: NEGATIVE

## 2021-02-07 PROCEDURE — 99285 EMERGENCY DEPT VISIT HI MDM: CPT

## 2021-02-07 PROCEDURE — 80307 DRUG TEST PRSMV CHEM ANLYZR: CPT | Performed by: EMERGENCY MEDICINE

## 2021-02-07 PROCEDURE — 0241U HB NFCT DS VIR RESP RNA 4 TRGT: CPT | Performed by: EMERGENCY MEDICINE

## 2021-02-07 PROCEDURE — 82075 ASSAY OF BREATH ETHANOL: CPT | Performed by: EMERGENCY MEDICINE

## 2021-02-07 PROCEDURE — 99285 EMERGENCY DEPT VISIT HI MDM: CPT | Performed by: EMERGENCY MEDICINE

## 2021-02-07 RX ORDER — CLONAZEPAM 0.5 MG/1
1 TABLET ORAL 3 TIMES DAILY
Status: DISCONTINUED | OUTPATIENT
Start: 2021-02-08 | End: 2021-02-08 | Stop reason: HOSPADM

## 2021-02-07 RX ORDER — TRAZODONE HYDROCHLORIDE 50 MG/1
50 TABLET ORAL
Status: DISCONTINUED | OUTPATIENT
Start: 2021-02-08 | End: 2021-02-08 | Stop reason: HOSPADM

## 2021-02-07 RX ORDER — BUPROPION HYDROCHLORIDE 300 MG/1
300 TABLET ORAL DAILY
Status: DISCONTINUED | OUTPATIENT
Start: 2021-02-08 | End: 2021-02-08 | Stop reason: HOSPADM

## 2021-02-07 RX ORDER — ATORVASTATIN CALCIUM 10 MG/1
10 TABLET, FILM COATED ORAL
Status: DISCONTINUED | OUTPATIENT
Start: 2021-02-08 | End: 2021-02-08 | Stop reason: HOSPADM

## 2021-02-07 RX ORDER — BUSPIRONE HYDROCHLORIDE 15 MG/1
15 TABLET ORAL 3 TIMES DAILY
Status: DISCONTINUED | OUTPATIENT
Start: 2021-02-08 | End: 2021-02-08 | Stop reason: HOSPADM

## 2021-02-07 RX ORDER — LORAZEPAM 1 MG/1
1 TABLET ORAL ONCE
Status: COMPLETED | OUTPATIENT
Start: 2021-02-07 | End: 2021-02-07

## 2021-02-07 RX ADMIN — LORAZEPAM 1 MG: 1 TABLET ORAL at 21:52

## 2021-02-08 ENCOUNTER — HOSPITAL ENCOUNTER (INPATIENT)
Facility: HOSPITAL | Age: 31
LOS: 3 days | Discharge: HOME | DRG: 750 | End: 2021-02-11
Attending: STUDENT IN AN ORGANIZED HEALTH CARE EDUCATION/TRAINING PROGRAM | Admitting: STUDENT IN AN ORGANIZED HEALTH CARE EDUCATION/TRAINING PROGRAM
Payer: COMMERCIAL

## 2021-02-08 VITALS
SYSTOLIC BLOOD PRESSURE: 138 MMHG | OXYGEN SATURATION: 95 % | HEART RATE: 100 BPM | TEMPERATURE: 97.6 F | BODY MASS INDEX: 25.1 KG/M2 | DIASTOLIC BLOOD PRESSURE: 81 MMHG | RESPIRATION RATE: 16 BRPM | WEIGHT: 180 LBS

## 2021-02-08 DIAGNOSIS — F22 PARANOID DELUSION (HCC): ICD-10-CM

## 2021-02-08 DIAGNOSIS — F25.9 SCHIZOAFFECTIVE DISORDER, UNSPECIFIED TYPE (HCC): Primary | ICD-10-CM

## 2021-02-08 RX ORDER — HALOPERIDOL 5 MG/ML
5 INJECTION INTRAMUSCULAR EVERY 6 HOURS PRN
Status: DISCONTINUED | OUTPATIENT
Start: 2021-02-08 | End: 2021-02-11 | Stop reason: HOSPADM

## 2021-02-08 RX ORDER — ATORVASTATIN CALCIUM 10 MG/1
10 TABLET, FILM COATED ORAL
Status: CANCELLED | OUTPATIENT
Start: 2021-02-08

## 2021-02-08 RX ORDER — TRAZODONE HYDROCHLORIDE 50 MG/1
50 TABLET ORAL
Status: DISCONTINUED | OUTPATIENT
Start: 2021-02-08 | End: 2021-02-11 | Stop reason: HOSPADM

## 2021-02-08 RX ORDER — MAGNESIUM HYDROXIDE/ALUMINUM HYDROXICE/SIMETHICONE 120; 1200; 1200 MG/30ML; MG/30ML; MG/30ML
30 SUSPENSION ORAL EVERY 4 HOURS PRN
Status: DISCONTINUED | OUTPATIENT
Start: 2021-02-08 | End: 2021-02-11 | Stop reason: HOSPADM

## 2021-02-08 RX ORDER — RISPERIDONE 1 MG/1
1 TABLET, ORALLY DISINTEGRATING ORAL
Status: DISCONTINUED | OUTPATIENT
Start: 2021-02-08 | End: 2021-02-11 | Stop reason: HOSPADM

## 2021-02-08 RX ORDER — OLANZAPINE 10 MG/1
10 INJECTION, POWDER, LYOPHILIZED, FOR SOLUTION INTRAMUSCULAR EVERY 8 HOURS PRN
Status: CANCELLED | OUTPATIENT
Start: 2021-02-08

## 2021-02-08 RX ORDER — IBUPROFEN 600 MG/1
600 TABLET ORAL EVERY 6 HOURS PRN
Status: CANCELLED | OUTPATIENT
Start: 2021-02-08

## 2021-02-08 RX ORDER — HYDROXYZINE HYDROCHLORIDE 25 MG/1
25 TABLET, FILM COATED ORAL EVERY 6 HOURS PRN
Status: DISCONTINUED | OUTPATIENT
Start: 2021-02-08 | End: 2021-02-11 | Stop reason: HOSPADM

## 2021-02-08 RX ORDER — ACETAMINOPHEN 325 MG/1
650 TABLET ORAL EVERY 4 HOURS PRN
Status: CANCELLED | OUTPATIENT
Start: 2021-02-08

## 2021-02-08 RX ORDER — HYDROXYZINE HYDROCHLORIDE 25 MG/1
50 TABLET, FILM COATED ORAL EVERY 6 HOURS PRN
Status: CANCELLED | OUTPATIENT
Start: 2021-02-08

## 2021-02-08 RX ORDER — MAGNESIUM HYDROXIDE/ALUMINUM HYDROXICE/SIMETHICONE 120; 1200; 1200 MG/30ML; MG/30ML; MG/30ML
30 SUSPENSION ORAL EVERY 4 HOURS PRN
Status: CANCELLED | OUTPATIENT
Start: 2021-02-08

## 2021-02-08 RX ORDER — LORAZEPAM 2 MG/ML
2 INJECTION INTRAMUSCULAR EVERY 6 HOURS PRN
Status: DISCONTINUED | OUTPATIENT
Start: 2021-02-08 | End: 2021-02-11 | Stop reason: HOSPADM

## 2021-02-08 RX ORDER — TRAZODONE HYDROCHLORIDE 50 MG/1
50 TABLET ORAL
Status: CANCELLED | OUTPATIENT
Start: 2021-02-08

## 2021-02-08 RX ORDER — HALOPERIDOL 5 MG
5 TABLET ORAL EVERY 6 HOURS PRN
Status: DISCONTINUED | OUTPATIENT
Start: 2021-02-08 | End: 2021-02-11 | Stop reason: HOSPADM

## 2021-02-08 RX ORDER — RISPERIDONE 1 MG/1
1 TABLET, ORALLY DISINTEGRATING ORAL
Status: CANCELLED | OUTPATIENT
Start: 2021-02-08

## 2021-02-08 RX ORDER — BUSPIRONE HYDROCHLORIDE 15 MG/1
15 TABLET ORAL 3 TIMES DAILY
Status: DISCONTINUED | OUTPATIENT
Start: 2021-02-08 | End: 2021-02-11 | Stop reason: HOSPADM

## 2021-02-08 RX ORDER — CLONAZEPAM 0.5 MG/1
1 TABLET ORAL 3 TIMES DAILY
Status: CANCELLED | OUTPATIENT
Start: 2021-02-08

## 2021-02-08 RX ORDER — HYDROXYZINE 50 MG/1
50 TABLET, FILM COATED ORAL EVERY 6 HOURS PRN
Status: DISCONTINUED | OUTPATIENT
Start: 2021-02-08 | End: 2021-02-11 | Stop reason: HOSPADM

## 2021-02-08 RX ORDER — BENZTROPINE MESYLATE 1 MG/ML
1 INJECTION INTRAMUSCULAR; INTRAVENOUS EVERY 6 HOURS PRN
Status: CANCELLED | OUTPATIENT
Start: 2021-02-08

## 2021-02-08 RX ORDER — LORAZEPAM 1 MG/1
1 TABLET ORAL EVERY 6 HOURS PRN
Status: DISCONTINUED | OUTPATIENT
Start: 2021-02-08 | End: 2021-02-11 | Stop reason: HOSPADM

## 2021-02-08 RX ORDER — BUSPIRONE HYDROCHLORIDE 15 MG/1
15 TABLET ORAL 3 TIMES DAILY
Status: CANCELLED | OUTPATIENT
Start: 2021-02-08

## 2021-02-08 RX ORDER — NICOTINE 21 MG/24HR
14 PATCH, TRANSDERMAL 24 HOURS TRANSDERMAL ONCE
Status: DISCONTINUED | OUTPATIENT
Start: 2021-02-08 | End: 2021-02-08 | Stop reason: HOSPADM

## 2021-02-08 RX ORDER — OLANZAPINE 10 MG/1
10 INJECTION, POWDER, LYOPHILIZED, FOR SOLUTION INTRAMUSCULAR EVERY 8 HOURS PRN
Status: DISCONTINUED | OUTPATIENT
Start: 2021-02-08 | End: 2021-02-11 | Stop reason: HOSPADM

## 2021-02-08 RX ORDER — HALOPERIDOL 5 MG/ML
5 INJECTION INTRAMUSCULAR EVERY 6 HOURS PRN
Status: CANCELLED | OUTPATIENT
Start: 2021-02-08

## 2021-02-08 RX ORDER — ACETAMINOPHEN 325 MG/1
325 TABLET ORAL EVERY 6 HOURS PRN
Status: CANCELLED | OUTPATIENT
Start: 2021-02-08

## 2021-02-08 RX ORDER — ATORVASTATIN CALCIUM 10 MG/1
10 TABLET, FILM COATED ORAL
Status: DISCONTINUED | OUTPATIENT
Start: 2021-02-09 | End: 2021-02-11 | Stop reason: HOSPADM

## 2021-02-08 RX ORDER — HYDROXYZINE HYDROCHLORIDE 25 MG/1
25 TABLET, FILM COATED ORAL EVERY 6 HOURS PRN
Status: CANCELLED | OUTPATIENT
Start: 2021-02-08

## 2021-02-08 RX ORDER — BUPROPION HYDROCHLORIDE 300 MG/1
300 TABLET ORAL DAILY
Status: CANCELLED | OUTPATIENT
Start: 2021-02-09

## 2021-02-08 RX ORDER — HALOPERIDOL 5 MG
5 TABLET ORAL EVERY 6 HOURS PRN
Status: CANCELLED | OUTPATIENT
Start: 2021-02-08

## 2021-02-08 RX ORDER — IBUPROFEN 600 MG/1
600 TABLET ORAL EVERY 6 HOURS PRN
Status: DISCONTINUED | OUTPATIENT
Start: 2021-02-08 | End: 2021-02-11 | Stop reason: HOSPADM

## 2021-02-08 RX ORDER — BUPROPION HYDROCHLORIDE 300 MG/1
300 TABLET ORAL DAILY
Status: DISCONTINUED | OUTPATIENT
Start: 2021-02-09 | End: 2021-02-11 | Stop reason: HOSPADM

## 2021-02-08 RX ORDER — BENZTROPINE MESYLATE 0.5 MG/1
1 TABLET ORAL EVERY 6 HOURS PRN
Status: CANCELLED | OUTPATIENT
Start: 2021-02-08

## 2021-02-08 RX ORDER — ACETAMINOPHEN 325 MG/1
325 TABLET ORAL EVERY 6 HOURS PRN
Status: DISCONTINUED | OUTPATIENT
Start: 2021-02-08 | End: 2021-02-11 | Stop reason: HOSPADM

## 2021-02-08 RX ORDER — OLANZAPINE 10 MG/1
10 TABLET ORAL EVERY 8 HOURS PRN
Status: DISCONTINUED | OUTPATIENT
Start: 2021-02-08 | End: 2021-02-11 | Stop reason: HOSPADM

## 2021-02-08 RX ORDER — ACETAMINOPHEN 325 MG/1
650 TABLET ORAL EVERY 4 HOURS PRN
Status: DISCONTINUED | OUTPATIENT
Start: 2021-02-08 | End: 2021-02-11 | Stop reason: HOSPADM

## 2021-02-08 RX ORDER — CLONAZEPAM 1 MG/1
1 TABLET ORAL 3 TIMES DAILY
Status: DISCONTINUED | OUTPATIENT
Start: 2021-02-08 | End: 2021-02-11 | Stop reason: HOSPADM

## 2021-02-08 RX ORDER — BENZTROPINE MESYLATE 1 MG/1
1 TABLET ORAL EVERY 6 HOURS PRN
Status: DISCONTINUED | OUTPATIENT
Start: 2021-02-08 | End: 2021-02-11 | Stop reason: HOSPADM

## 2021-02-08 RX ORDER — LORAZEPAM 1 MG/1
1 TABLET ORAL EVERY 6 HOURS PRN
Status: CANCELLED | OUTPATIENT
Start: 2021-02-08

## 2021-02-08 RX ORDER — LORAZEPAM 2 MG/ML
2 INJECTION INTRAMUSCULAR EVERY 6 HOURS PRN
Status: CANCELLED | OUTPATIENT
Start: 2021-02-08

## 2021-02-08 RX ORDER — BENZTROPINE MESYLATE 1 MG/ML
1 INJECTION INTRAMUSCULAR; INTRAVENOUS EVERY 6 HOURS PRN
Status: DISCONTINUED | OUTPATIENT
Start: 2021-02-08 | End: 2021-02-11 | Stop reason: HOSPADM

## 2021-02-08 RX ORDER — OLANZAPINE 2.5 MG/1
10 TABLET ORAL EVERY 8 HOURS PRN
Status: CANCELLED | OUTPATIENT
Start: 2021-02-08

## 2021-02-08 RX ADMIN — Medication 14 MG: at 13:48

## 2021-02-08 RX ADMIN — BUSPIRONE HYDROCHLORIDE 15 MG: 15 TABLET ORAL at 16:19

## 2021-02-08 RX ADMIN — TRAZODONE HYDROCHLORIDE 50 MG: 50 TABLET ORAL at 21:18

## 2021-02-08 RX ADMIN — BUSPIRONE HYDROCHLORIDE 15 MG: 15 TABLET ORAL at 08:28

## 2021-02-08 RX ADMIN — BUPROPION HYDROCHLORIDE 300 MG: 300 TABLET, FILM COATED, EXTENDED RELEASE ORAL at 08:28

## 2021-02-08 RX ADMIN — CLONAZEPAM 1 MG: 0.5 TABLET ORAL at 08:28

## 2021-02-08 RX ADMIN — NICOTINE POLACRILEX 2 MG: 2 GUM, CHEWING BUCCAL at 21:18

## 2021-02-08 RX ADMIN — NICOTINE POLACRILEX 2 MG: 2 GUM, CHEWING BUCCAL at 18:59

## 2021-02-08 RX ADMIN — CLONAZEPAM 1 MG: 0.5 TABLET ORAL at 16:19

## 2021-02-08 RX ADMIN — CLONAZEPAM 1 MG: 1 TABLET ORAL at 21:18

## 2021-02-08 RX ADMIN — BUSPIRONE HYDROCHLORIDE 15 MG: 15 TABLET ORAL at 21:18
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
## 2021-02-08 NOTE — EMTALA/ACUTE CARE TRANSFER
[facility] EMERGENCY DEPARTMENT  [address] [facility] [address]  Dept: [phone]      NVNABB TRANSFER CONSENT    NAME Alison Collet                                         1990                              MRN [id number]    I have been informed of my rights regarding examination, treatment, and transfer   by Dr Desi Messer DO    Benefits: Specialized equipment and/or services available at the receiving facility (Include comment)________________________(inpatient locked behavioral health unit)    Risks: Potential for delay in receiving treatment, Potential deterioration of medical condition, Increased discomfort during transfer, Possible worsening of condition or death during transfer      Consent for Transfer:  I acknowledge that my medical condition has been evaluated and explained to me by the emergency department physician or other qualified medical person and/or my attending physician, who has recommended that I be transferred to the service of  Accepting Physician: Brittany NEWMAN with psychiatry at [address] [place] [address] Rd Name, [address] : [address], [place] [facility]  The above potential benefits of such transfer, the potential risks associated with such transfer, and the probable risks of not being transferred have been explained to me, and I fully understand them  The doctor has explained that, in my case, the benefits of transfer outweigh the risks  I agree to be transferred  I authorize the performance of emergency medical procedures and treatments upon me in both transit and upon arrival at the receiving facility  Additionally, I authorize the release of any and all medical records to the receiving facility and request they be transported with me, if possible  I understand that the safest mode of transportation during a medical emergency is an ambulance and that the Hospital advocates the use of this mode of transport   Risks of traveling to the receiving facility by car, including absence of medical control, life sustaining equipment, such as oxygen, and medical personnel has been explained to me and I fully understand them  (NEGRA CORRECT BOX BELOW)  [  ]  I consent to the stated transfer and to be transported by ambulance/helicopter  [  ]  I consent to the stated transfer, but refuse transportation by ambulance and accept full responsibility for my transportation by car  I understand the risks of non-ambulance transfers and I exonerate the Hospital and its staff from any deterioration in my condition that results from this refusal     X___________________________________________    DATE  21  TIME________  Signature of patient or legally responsible individual signing on patient behalf           RELATIONSHIP TO PATIENT_________________________          Provider Certification    NAME Zia Bowie                                        [facility] 1990                              MRN [id number]    A medical screening exam was performed on the above named patient  Based on the examination:    Condition Necessitating Transfer The primary encounter diagnosis was Paranoid delusion ([address] )  Diagnoses of Methamphetamine use ([facility] ) and Noncompliance with medications were also pertinent to this visit      Patient Condition: The patient has been stabilized such that within reasonable medical probability, no material deterioration of the patient condition or the condition of the unborn child(tarun) is likely to result from the transfer    Reason for Transfer: Level of Care needed not available at this facility    Transfer Requirements: [address], [place] [facility]   · Space available and qualified personnel available for treatment as acknowledged by A [place]win crisis worker via tiger text  · Agreed to accept transfer and to provide appropriate medical treatment as acknowledged by       Pierre NEWMAN with psychiatry  · Appropriate medical records of the examination and treatment of the patient are provided at the time of transfer   [address] [facility] _______  · Transfer will be performed by qualified personnel from [facility]  and appropriate transfer equipment as required, including the use of necessary and appropriate life support measures  Provider Certification: I have examined the patient and explained the following risks and benefits of being transferred/refusing transfer to the patient/family:  General risk, such as traffic hazards, adverse weather conditions, rough terrain or turbulence, possible failure of equipment (including vehicle or aircraft), or consequences of actions of persons outside the control of the transport personnel, Unanticipated needs of medical equipment and personnel during transport, Risk of worsening condition, The possibility of a transport vehicle being unavailable, Consent was not obtained as patient is committed to psychiatric facility and transfer is mandated, The patient is stable for psychiatric transfer because they are medically stable, and is protected from harming him/herself or others during transport      Based on these reasonable risks and benefits to the patient and/or the unborn child(tarun), and based upon the information available at the time of the patients examination, I certify that the medical benefits reasonably to be expected from the provision of appropriate medical treatments at another medical facility outweigh the increasing risks, if any, to the individuals medical condition, and in the case of labor to the unborn child, from effecting the transfer      X____________________________________________ DATE 02/08/21        TIME_______      ORIGINAL - SEND TO MEDICAL RECORDS   COPY - SEND WITH PATIENT DURING TRANSFER
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
## 2021-02-08 NOTE — ED NOTES
Patient is accepted at [facility]  Patient is accepted by Lori Johnson at intake     Transportation is TBD        Nurse report is to be called to [phone] prior to patient transfer
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
## 2021-02-08 NOTE — ED NOTES
Call placed to patient insurance [facility] [phone], spoke with Elizabeth Guy   Awaiting call back to provide clinical

## 2021-02-08 NOTE — ED NOTES
Pt shouting "Shut the fuck up!" and mumbling   Unknown who or what he is talking to     ANDREW Gordon  02/08/21 0832
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
## 2021-02-08 NOTE — ED NOTES
Spoke with Angi, at [facility], p/u via CTS at 1800  Franklyn Cai at intake aware of ETA

## 2021-02-09 PROBLEM — Z72.0 TOBACCO ABUSE: Status: ACTIVE | Noted: 2021-02-09

## 2021-02-09 PROBLEM — Z00.8 MEDICAL CLEARANCE FOR PSYCHIATRIC ADMISSION: Status: ACTIVE | Noted: 2021-02-09

## 2021-02-09 PROCEDURE — 99222 1ST HOSP IP/OBS MODERATE 55: CPT | Performed by: STUDENT IN AN ORGANIZED HEALTH CARE EDUCATION/TRAINING PROGRAM

## 2021-02-09 PROCEDURE — 99253 IP/OBS CNSLTJ NEW/EST LOW 45: CPT | Performed by: PHYSICIAN ASSISTANT

## 2021-02-09 RX ORDER — NICOTINE 21 MG/24HR
1 PATCH, TRANSDERMAL 24 HOURS TRANSDERMAL DAILY
Status: DISCONTINUED | OUTPATIENT
Start: 2021-02-09 | End: 2021-02-11 | Stop reason: HOSPADM

## 2021-02-09 RX ADMIN — NICOTINE POLACRILEX 2 MG: 2 GUM, CHEWING BUCCAL at 18:17

## 2021-02-09 RX ADMIN — RISPERIDONE 3 MG: 2 TABLET ORAL at 21:24

## 2021-02-09 RX ADMIN — CLONAZEPAM 1 MG: 1 TABLET ORAL at 08:43

## 2021-02-09 RX ADMIN — NICOTINE POLACRILEX 2 MG: 2 GUM, CHEWING BUCCAL at 10:48

## 2021-02-09 RX ADMIN — NICOTINE 1 PATCH: 21 PATCH, EXTENDED RELEASE TRANSDERMAL at 13:38

## 2021-02-09 RX ADMIN — BUSPIRONE HYDROCHLORIDE 15 MG: 15 TABLET ORAL at 20:16

## 2021-02-09 RX ADMIN — CLONAZEPAM 1 MG: 1 TABLET ORAL at 16:04

## 2021-02-09 RX ADMIN — BUSPIRONE HYDROCHLORIDE 15 MG: 15 TABLET ORAL at 16:04

## 2021-02-09 RX ADMIN — TRAZODONE HYDROCHLORIDE 50 MG: 50 TABLET ORAL at 21:24

## 2021-02-09 RX ADMIN — NICOTINE POLACRILEX 2 MG: 2 GUM, CHEWING BUCCAL at 13:38

## 2021-02-09 RX ADMIN — NICOTINE POLACRILEX 2 MG: 2 GUM, CHEWING BUCCAL at 20:16

## 2021-02-09 RX ADMIN — ATORVASTATIN CALCIUM 10 MG: 10 TABLET, FILM COATED ORAL at 16:04

## 2021-02-09 RX ADMIN — BUPROPION HYDROCHLORIDE 300 MG: 300 TABLET, FILM COATED, EXTENDED RELEASE ORAL at 08:43

## 2021-02-09 RX ADMIN — NICOTINE POLACRILEX 2 MG: 2 GUM, CHEWING BUCCAL at 16:21

## 2021-02-09 RX ADMIN — BUSPIRONE HYDROCHLORIDE 15 MG: 15 TABLET ORAL at 08:43

## 2021-02-09 RX ADMIN — NICOTINE POLACRILEX 2 MG: 2 GUM, CHEWING BUCCAL at 08:05

## 2021-02-09 RX ADMIN — CLONAZEPAM 1 MG: 1 TABLET ORAL at 20:16

## 2021-02-09 NOTE — TREATMENT PLAN
TREATMENT PLAN REVIEW - Derek [age] y o  1990 male MRN: [id number]    [address] [place] [address] Room / Bed: Ofe Flanagan [facility] 558-90 Encounter: [id number]          Admit Date/Time:  2/8/2021  6:12 PM    Treatment Team: Attending Provider: Samy Albert MD; Consulting Physician: Chantel Rose MD; Care Manager: Tony Poon, BUCK; Patient Care Assistant: Gay Duarte; Patient Care Technician: Essence Dao; Patient Care Technician: Adrienne Alejandro; Medications RN: Henny Rodriguez, BUCK; Occupational Therapy Assistant: VIKI David; Registered Nurse: Suzy Mora, RN; : Ike De Anda; Registered Nurse: Merlyn Moreno, BUCK; Security: Darnell Smoker;  Therapy Student: Lucrecia Rose; Registered Nurse: Silva Dey RN    Diagnosis: Principal Problem:    Schizoaffective disorder ([facility] )  Active Problems:    High cholesterol    Medical clearance for psychiatric admission    Tobacco abuse      Patient Strengths/Assets: cooperative, good past treatment response, motivation for treatment/growth, patient is on a voluntary commitment    Patient Barriers/Limitations: self-care deficit, substance abuse    Short Term Goals: decrease in depressive symptoms, decrease in anxiety symptoms, decrease in paranoid thoughts, decrease in psychotic symptoms, decrease in suicidal thoughts, decrease in self abusive behaviors    Long Term Goals: improvement in depression, improvement in anxiety, resolution of depressive symptoms, resolution of manic symptoms, stabilization of mood, free of suicidal thoughts    Progress Towards Goals: starting psychiatric medications as prescribed    Recommended Treatment: medication management, patient medication education, group therapy, milieu therapy, continued Behavioral Health psychiatric evaluation/assessment process    Treatment Frequency: daily medication monitoring, group and milieu therapy daily, monitoring through interdisciplinary rounds, monitoring through weekly patient care conferences    Expected Discharge Date:  5-7 days    Discharge Plan: referral for outpatient medication management with a psychiatrist, referral for outpatient psychotherapy, referral to outpatient drug and alcohol rehabilitation program    Treatment Plan Created/Updated By: Cornel Haque MD

## 2021-02-09 NOTE — CASE MANAGEMENT
CM received c/b from [address] [facility] [employer] [phone] who stated primary CM is [place] who was scheduled to see pt tomorrow, Meli Lozano stated she will reach out to [place] and provide CM contact to make aware of new scheduled appointment

## 2021-02-09 NOTE — PROGRESS NOTES
Pt seclusive to room much of the day  OOB for meals and meds  Pt with flat affect, scant in conversation  Pt did slam door of his room and then states "I'm fine" to staff  On follow up, pt reports he was feeling irritable d/t feeling hungry  New order for Ensure BID which pt was provided after lunch

## 2021-02-09 NOTE — DISCHARGE INSTR - OTHER ORDERS
You will be discharged to your home today [place] [address], [place] - Your parents will pick you up  What you need to know aboutcoronavirus disease 2019 (COVID-19)     What is coronavirus disease 2019 (COVID-19)? Coronavirus disease 2019 (COVID-19) is a respiratory illness that can spread from person to person  The virus that causes COVID-19 is a novel coronavirus that was first identified during an investigation into an outbreak in [place], [place]  Can people in [place]  get COVID-19? Yes  COVID-19 is spreading from person to person in parts of the [place] [facility]  Risk of infection with COVID-19 is higher for people who are close contacts of someone known to have COVID-19, for example healthcare workers, or household members  Other people at higher risk for infection are those who live in or have recently been in an area with ongoing spread of COVID-19  Learn more about places with ongoing spread at   [facility]  html#geographic  Have there been cases of COVID-19 in [place] ?   Yes  The first case of COVID-19 in the [place] was reported on January 21, 2020  The current count of cases of COVID-19 in the [place] is available on [employer] at [facility]  How does COVID-19 spread? The virus that causes COVID-19 probably emerged from an animal source, but is now spreading from person to person  The virus is thought to spread mainly between people who are in close contact with one another (within about 6 feet) through respiratory droplets produced when an infected person coughs or sneezes  It also may be possible that a person can get COVID-19 by touching a surface or object that has the virus on it and then touching their own mouth, nose, or possibly their eyes, but this is not thought to be the main way the virus spreads   Learn what is known about the spread of newly emerged coronaviruses at [facility]  What are the symptoms of COVID-19? Patients with COVID-19 have had mild to severe respiratory illness with symptoms of   fever   cough   shortness of breath  What are severe complications from this virus? Some patients have pneumonia in both lungs, multi-organ failure and in some cases death  How can I help protect myself? People can help protect themselves from respiratory illness with everyday preventive actions  Avoid close contact with people who are sick  Avoid touching your eyes, nose, and mouth withunwashed hands  Wash your hands often with soap and water for at least 20 seconds  Use an alcohol-based hand  that contains at least 60% alcohol if soap and water are not available  If you are sick, to keep from spreading respiratory illness to others, you should   Stay home when you are sick  Cover your cough or sneeze with a tissue, then throw the tissue in the trash  Clean and disinfect frequently touched objectsand surfaces  What should I do if I recently traveled from an area with ongoing spread of COVID-19? If you have traveled from an affected area, there may be restrictions on your movements for up to 2 weeks  If you develop symptoms during that period (fever, cough, trouble breathing), seek medical advice  Call the office of your health care provider before you go, and tell them about your travel and your symptoms  They will give you instructions on how to get care without exposing other people to your illness  While sick, avoid contact with people, don't go out and delay any travel to reduce the possibility of spreading illness to others  Is there a vaccine? There is currently no vaccine to protect against COVID-19  The best way to prevent infection is to take everyday preventive actions, like avoiding close contact with people who are sick and washing your hands often     Is there a treatment? There is no specific antiviral treatment for COVID-19  People with COVID-19 can seek medical care to helprelieve symptoms  For more information: www cdc gov/IHHSG29VC 039547-C 03/03/2020       What to do if you are sick withcoronavirus disease 2019 (COVID-19)     If you are sick with COVID-19 or suspect you are infected with the virus that causes COVID-19, follow the steps below to help prevent the disease from spreading to people in your home and community  Stay home except to get medical care   You should restrict activities outside your home, except for getting medical care  Do not go to work, school, or public areas  Avoid using public transportation, ride-sharing, or taxis  Separate yourself from other people and animals inyour home  People: As much as possible, you should stay in a specific room and away from other people in your home  Also, you should use a separate bathroom, if available  Animals: Do not handle pets or other animals while sick  See COVID-19 and Animals for more information  Call ahead before visiting your doctor   If you have a medical appointment, call the healthcare provider and tell them that you have or may have COVID-19  This will help the healthcare provider's office take steps to keep other people from getting infected or exposed  Wear a facemask  You should wear a facemask when you are around other people (e g , sharing a room or vehicle) or pets and before you enter a healthcare provider's office  If you are not able to wear a facemask (for example, because it causes trouble breathing), then people who live with you should not stay in the same room with you, or they should wear a facemask if they enteryour room  Cover your coughs and sneezes   Cover your mouth and nose with a tissue when you cough or sneeze   Throw used tissues in a lined trash can; immediately wash your hands with soap and water for at least 20 seconds or clean your hands with an alcohol-based hand  that contains at least 60 to 95% alcohol, covering all surfaces of your hands and rubbing them together until they feel dry  Soap and water should be used preferentially if hands are visibly dirty  Avoid sharing personal household items   You should not share dishes, drinking glasses, cups, eating utensils, towels, or bedding with other people or pets in your home  After using these items, they should be washed thoroughly with soap and water  Clean your hands often  Wash your hands often with soap and water for at least 20 seconds  If soap and water are not available, clean your hands with an alcohol-based hand  that contains at least 60% alcohol, covering all surfaces of your hands and rubbing them together until they feel dry  Soap and water should be used preferentially if hands are visibly dirty  Avoid touching your eyes, nose, and mouth with unwashed hands  Clean all "high-touch" surfaces every day  High touch surfaces include counters, tabletops, doorknobs, bathroom fixtures, toilets, phones, keyboards, tablets, and bedside tables  Also, clean any surfaces that may have blood, stool, or body fluids on them  Use a household cleaning spray or wipe, according to the label instructions  Labels contain instructions for safe and effective use of the cleaning product including precautions you should take when applying the product, such as wearing gloves and making sure you have good ventilation during use of the product  Monitor your symptoms  Seek prompt medical attention if your illness is worsening (e g , difficulty breathing)  Before seeking care, call your healthcare provider and tell them that you have, or are being evaluated for, COVID-19  Put on a facemask before you enter the facility  These steps will help the healthcare provider's office to keep other people in the office or waiting room from getting infectedor exposed     Ask your healthcare provider to call the local or On license of [facility] health department  Persons who are placed under active monitoring or facilitated self-monitoring should follow instructions provided by their local health department or occupational health professionals, as appropriate  If you have a medical emergency and need to call 911, notify the dispatch personnel that you have, or are being evaluated for COVID-19  If possible, put on a facemask before emergency medical services arrive  Discontinuing home isolation  Patients with confirmed COVID-19 should remain under home isolation precautions until the risk of secondary transmission to others is thought to be low  The decision to discontinue home isolation precautions should be made on a case-by-case basis, in consultation with healthcare providers and state and local health departments  For more information: www cdc gov/HHVKD09BD 344481-V 02/24/2020       Stay home when you are sick,except to get medical care  Wash your hands often with soap and water for at least 20 seconds  Cover your cough or sneeze with a tissue, then throw the tissue in the trash  Clean and disinfect frequently touched objects and surfaces  Avoid touching your eyes, nose, and mouth  STOP THE SPREAD OF GERMS  For more information: www cdc gov/COVID19 Avoid close contact with people who are sick  Help prevent the spread of respiratory diseases like COVID-19  [facility] [place]   COVID-19 CRISIS COUNSELING PROGRAM   GET CONNECTED WITH A FREE CRISIS COUNSELOR   CALL [phone]   Do you feel    Stressed? Overwhelmed? Alone? Afraid? Anxiety? During these uncertain times, you are not alone  We are here to listen  Please call our [facility] and Referral Line   [phone] TTY: [phone]   There are trained professionals available 24/7 ready to help you navigate these unprecedented challenges  These services are FREE & CONFIDENTIAL     This publication was made possible by Ranjeet Figueroa Number [id number], in collaboration with the [address] [place] [address]  Mental Health Resources in [address] is not simply the moment when things become intolerable  Crises build over time, and often can be recognized and managed in advance  [address] provides not only immediate support for crisis situations, but also assistance with managing recurring or future crises  Support is available 24 hours a day, 7 days a week at [phone] ([phone])  This service is available to anyone in [facility], including children, teens, adults, and families  STAGES OF CRISIS MANAGEMENT  Before a crisis  When you start to recognize the stressors that you or a loved one have felt during previous crises, please call Julianne Jeong peer support talk line at [phone]  It is available, free of charge, 7 days a week, 1:00pm to 9:00pm   [facility] also has a [employer] that can be reached by calling [phone] or texting [phone]  It is available Monday through Friday, 3:00pm to 9:00pm     During a crisis  When you or a loved one are experiencing a crisis, [facility] is available to help  Just call [phone]  The line is open 24 hours per day, 7 days per week  After a crisis  [facility] would like to help you develop ways to help reduce future crisis situations and create a crisis plan as part of your (or your child's, or your family's) recovery and wellness goals      [address] is provided by [employer], and includes the following services:  24 hour telephone counseling  Services provided in the individuals home  Assistance with developing strategies for reducing recurring crisis  Support for drug/alcohol use or addiction  Help coping with past traumatic experiences  Emergency respite  Peer support  Assistance connecting to local community resources  WHAT IF MOBILE CRISIS SUPPORTS ARE NOT ENOUGH? Crisis Residential Services are short term residences for adults who are experiencing psychiatric crisis  If you support someone who requires emergency assistance due to imminent risk of harm to him/herself or others, please call [facility] at: [phone]  For TTY users, please call [phone]  The line is open 24 hours a day and seven days a week  [employer]                                         [phone]    [facility]                                     [address] [facility]                                                              [phone]    [facility]                                           [phone]    Shannon Colindres                                                                     [address] [facility]                                                                          [phone]         Housing Emergency     Your [facility] Home Call Center                                                       Cristiana Asher [facility] Drug and Alcohol Resources     OFFICE OF DRUG & ALCOHOL  MISSION STATEMENT  The Office of Drug and Alcohol is committed to the prevention and treatment of substance abuse problems in [facility]  Services are delivered in partnership with qualified providers and are guided by a philosophy that embraces hope, respect, and support for recovery  The [facility] provides a wide range of drug and alcohol services to [facility] residents in the areas of Prevention, Intervention, Treatment, and Recovery Support  Each year approximately [facility] residents receive drug and alcohol awareness counseling (prevention)  In addition, 3,500 residents receive short-term counseling (intervention), and 1,500 residents receive ongoing outpatient and inpatient care (treatment), with the primary focus being on attaining and maintaining a drug and alcohol-free life  Prevention and intervention services are provided to the community at large regardless of personal income  Treatment services require a comprehensive clinical and financial assessment  Trained  perform these assessments in order to determine the most appropriate funding resource and level of care, and to find the provider best suited to meet an individuals treatment needs  Recovery support services are non-clinical services that assist individuals and their family members to recover from substance use disorders  These services are supplemental to a treatment program and focus on outreach, engagement, education and other strategies to assist individuals in engaging, maintaining and sustaining long-term recovery  Recovery Support Services are also available to family members of individuals in various stages of recovery, through the utilization of Certified Family  services and/or parent support groups  CONTACT US      Yves Gonzalez    MAIN OFFICE  Office of Drug & Alcohol  [address] [place] [address] [address]  [address]   [address] [address], [address]    Phone: [phone]  Fax: [phone]    HOURS  8:00am - 4:30pm  Monday - Friday    PA Get Help Now ([phone])

## 2021-02-09 NOTE — CASE MANAGEMENT
Pt is a 24yo single male admitted on 2/8/21 1812 as a 201 from [address] [facility]  CM met with pt in order to complete initial intake/assessment and pt presented as cooperative yet lethargic  Pt reports 10 past IP psych admissions, last in June 2020 at [address] [place] [address]      Pt reports he lives at home with parents and plans to return there upon d/c  Pt signed BREANN for parents Ambrosio Rodriguez and Daisy Salgado [phone]  CM s/w parents who stated pt has not been taking his medications and has been paranoid and thinks they are doing this to him and that he does not have schizophrenia, prior to admission they state pt was yelling and becoming physically aggressive, bizarre behaviors like locking himself in the bathroom all day, [place] to kill the man who is doing this to him and SI to hurt himself  Reviewed pt's current medication and they report pt was also on risperidone 3mg one tablet at bedtime, information given to MD  Parents stated pt can return there however they are fearful of him not being stabilized on medications prior to returning home, they will transport pt at time of d/c  CM let them know pt signed 72 hr notice and will be d/c on Thursday, provided them with nurses station number  Pt signed BREANN for OP provider [facility] [phone]  CM contacted provider and notified of pt's admission  Pt had therapy appointment today appointment cancelled  Pt has f/u for medication management on 2/22 3:20pm via phone appointment added to pt AVS  CM s/w therapist who rescheduled pt for 2/16 at 2pm virtual, appointment added to AVS      Pt signed BREANN for CM with Glagalenstine Dakin [phone]  CM contacted Jack Enriquez and LVM requested c/b  Pt signed BREANN for [facility] [phone]  CM LVM and notified pf pt's admissions and scheduled d/c date  Pt AUDIT 0, UDS + Meth, PAWSS 0   Pt reports D&A issues as using meth "every once in a while I will slip up", Pt reports last use 3 days ago, pt reports only using once a month a 20 dollar bag IV  Pt reports 3 past D&A IP last in [place] a year ago  Pt reports utilizing AA/NA "a little bit" but that "I don't get much out of it"  Pt not open to a referral at this time  Pt reports PCP as Gorge Gonzalez, pt declined to sign an BREANN at this time  Pt reports pharmacy as [facility] in [place]  Pt reports that family drives him  Pt denies stressors/barriers/triggers  Pt reports breathing as a coping skill  Pt reports education as a GED and some college  Pt denies being employed  Pt denies any income  Pt reports chief complaint as "hearing voices"  The following resources were provided in pt AVS, COVID, [facility], Cirilo Rowley D&A

## 2021-02-09 NOTE — CASE MANAGEMENT
CM received a c/b from pt Barbara Dean who reported pt is scheduled for an appointment with her at his house on 2/12 at 2pm, appointment added to AVS
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
## 2021-02-09 NOTE — CMS CERTIFICATION NOTE
Recertification: Based upon physical, mental and social evaluations, I certify that inpatient psychiatric services continue to be medically necessary for this patient for a duration of 7 midnights for the treatment of  Schizoaffective disorder [facility])   Available alternative community resources still do not meet the patient's mental health care needs  I further attest that an established written individualized plan of care has been updated and is outlined in the patient's medical records

## 2021-02-09 NOTE — PROGRESS NOTES
02/09/21 1242  Team Meeting  Meeting Type Daily Rounds  Team Members Present  Team Members Present Physician;Nurse;;Occupational Therapist  Physician Team Member Dr Gurmeet Ace Team Member [facility] Management Team Member Inge/Katelyn/Patsy  OT Team Member Ninoska/Marcelina  Patient/Family Present  Patient Present No  Patient's Family Present No    Daily Rounds Note- pt signed 72hr 2/9 2108, d/c Thursday, 201 from [address], hearing voices thought it was his dad, using meth, got in an argument with father, has been off his meds, here to get back on meds, denies SI/HI/VH, has AH at times, paranoid at times

## 2021-02-09 NOTE — CASE MANAGEMENT
Therapist from [facility] [phone] called and stated partial may be a better option for pt but their program is not taking any [place] handy pt's at this time, let her know I would see if I could find any options  CM called [facility] who stated they do not think their partial takes [place] LVM to confirm  CM called [address] [facility] [address] [facility] [address] who stated they only have a partial for older adults  CM reached out to [facility]  [phone] who stated they do have a partial, are accenting new referrals and pt's insurance, program is from [facility] for 2 weeks in person  CM called parents and s/w mom Ta Stock [phone] to see if they would be able to transport pt, she stated her and her  would discuss and get back to CM

## 2021-02-09 NOTE — PROGRESS NOTES
Patient arrived on the unit with the following items:     At bedside:   2 bryson shirts  Pants  Zip up sweat shirt    In locker:   [address]   Phone  [phone]  2 [place] drivers license  $1  EBT card  [facility] Financial card

## 2021-02-09 NOTE — CONSULTS
Consult- Alison Collet 1990, 31 y o  male MRN: [id number]    Unit/Bed#: [facility] 258-02 Encounter: [id number]    Primary Care Provider: Silvio Rangel MD   Date and time admitted to hospital: 2/8/2021  6:12 PM      Inpatient consult for Medical Clearance for [facility] patient  Consult performed by: Yuni George PA-C  Consult ordered by: Melissa Mckeon PA-C        Medical clearance for psychiatric admission  Assessment & Plan  · Vital signs stable at time of assessment  · CBC, CMP, TSH pending  · Patient appears medically stable at this time for inpatient psychiatric treatment    Tobacco abuse  Assessment & Plan  · Continue nicotine patch, nicorette gum  ·  cessation    Schizoaffective disorder [facility])  Assessment & Plan  · Presented to the ED with reported AH  · Further plan per psychiatry    High cholesterol  Assessment & Plan  · Continue statin      VTE Prophylaxis: Reason for no pharmacologic prophylaxis low risk, ambulate  / reason for no mechanical VTE prophylaxis psychiatric unit, ambulate     Recommendations for Discharge:  · Follow up with PCP after discharge    Counseling / Coordination of Care Time: 20 minutes  Greater than 50% of total time spent on patient counseling and coordination of care  Collaboration of Care: Were Recommendations Directly Discussed with Primary Treatment Team? - No     History of Present Illness:    Alison Collet is a 31 y o  male who is originally admitted to the psychiatry service due to [facility]  We are consulted for medical clearance  Past medical history significant for tobacco abuse, schizoaffective disorder, hyperlipidemia  Presented to the emergency department with reported AH  Denies any physical complaints this morning including chest pain/palpitations, shortness of breath, nausea/vomiting, abdominal pain  Review of Systems:    Review of Systems   Constitutional: Negative for chills, fatigue, fever and unexpected weight change     HENT: Negative for congestion, sore throat and trouble swallowing  Eyes: Negative for photophobia, pain and visual disturbance  Respiratory: Negative for cough, shortness of breath and wheezing  Cardiovascular: Negative for chest pain, palpitations and leg swelling  Gastrointestinal: Negative for abdominal pain, constipation, diarrhea, nausea and vomiting  Endocrine: Negative for polyuria  Genitourinary: Negative for difficulty urinating, dysuria, flank pain, hematuria and urgency  Musculoskeletal: Negative for back pain, myalgias, neck pain and neck stiffness  Skin: Negative for pallor and rash  Neurological: Negative for dizziness, tremors, syncope, speech difficulty, weakness, light-headedness and headaches  Hematological: Does not bruise/bleed easily  Psychiatric/Behavioral: Positive for dysphoric mood and hallucinations  Negative for agitation and confusion  The patient is nervous/anxious          Past Medical and Surgical History:     Past Medical History:   Diagnosis Date    ADD (attention deficit disorder)     Depression     Drug abuse (HCC)     Insomnia     Schizophrenia ([facility])        Past Surgical History:   Procedure Laterality Date    WISDOM TOOTH EXTRACTION         Meds/Allergies:    all medications and allergies reviewed    Allergies: No Known Allergies    Social History:     Marital Status: Single    Substance Use History:   Social History     Substance and Sexual Activity   Alcohol Use No     Social History     Tobacco Use   Smoking Status Never Smoker   Smokeless Tobacco Never Used     Social History     Substance and Sexual Activity   Drug Use Not Currently    Types: Methamphetamines    Comment: last use of meth was this morning        Family History:    Family History   Problem Relation Age of Onset    Hypertension Mother     Hyperlipidemia Mother     Prostate cancer Father     Hyperlipidemia Father     Hypertension Maternal Grandmother     Uterine cancer Maternal Grandmother     Cancer Maternal Grandfather     Alcohol abuse Maternal Grandfather     Dementia Paternal Grandmother     Prostate cancer Paternal Grandfather     Lung cancer Paternal Grandfather     Seizures Neg Hx        Physical Exam:     Vitals:   Blood Pressure: 111/70 (02/09/21 0720)  Pulse: (!) 132(Double checked ) (02/09/21 0720)  Temperature: (!) 97 4 °F (36 3 °C) (02/09/21 0720)  Temp Source: Tympanic (02/09/21 0720)  Respirations: 17 (02/09/21 0720)  Height: 5' 10" (177 8 cm) (02/08/21 1820)  Weight - Scale: 78 7 kg (173 lb 9 6 oz) (02/08/21 1820)  SpO2: 96 % (02/08/21 1820)    Physical Exam  Vitals signs and nursing note reviewed  Constitutional:       Appearance: He is well-developed  Comments: Appears comfortable, no acute distress   HENT:      Head: Normocephalic and atraumatic  Eyes:      General: No scleral icterus  Extraocular Movements: Extraocular movements intact  Conjunctiva/sclera: Conjunctivae normal    Neck:      Musculoskeletal: Normal range of motion and neck supple  Cardiovascular:      Rate and Rhythm: Normal rate and regular rhythm  Heart sounds: S1 normal and S2 normal  No murmur  Pulmonary:      Effort: Pulmonary effort is normal  No respiratory distress  Breath sounds: Normal breath sounds  No wheezing, rhonchi or rales  Abdominal:      General: Bowel sounds are normal       Palpations: Abdomen is soft  Tenderness: There is no abdominal tenderness  There is no guarding or rebound  Musculoskeletal:      Comments: Ambulating unit without difficulty, no edema   Skin:     General: Skin is warm and dry  Neurological:      Mental Status: He is alert and oriented to person, place, and time  Psychiatric:         Mood and Affect: Affect is flat  Additional Data:     Lab Results: I have personally reviewed pertinent reports  No results found for: HGBA1C            Imaging: I have personally reviewed pertinent reports        No orders to display       EKG, Pathology, and Other Studies Reviewed on Admission:   · Reviewed as above    ** Please Note: This note has been constructed using a voice recognition system   **

## 2021-02-09 NOTE — CASE MANAGEMENT
CM s/w pt who stated he is not interested in partial and just wants to continue with his OP therapist  CM called mom Jazmín Hager and made aware, also LVM for therapist and made aware as well   Pt will continue with OP therapist

## 2021-02-09 NOTE — PLAN OF CARE
Problem: Alteration in Thoughts and Perception  Goal: Refrain from acting on delusional thinking/internal stimuli  Description: Interventions:  - Monitor patient closely, per order   - Utilize least restrictive measures   - Set reasonable limits, give positive feedback for acceptable   - Administer medications as ordered and monitor of potential side effects  Outcome: Progressing  Goal: Agree to be compliant with medication regime, as prescribed and report medication side effects  Description: Interventions:  - Offer appropriate PRN medication and supervise ingestion; conduct AIMS, as needed   Outcome: Progressing  Goal: Attend and participate in unit activities, including therapeutic, recreational, and educational groups  Description: Interventions:  -Encourage Visitation and family involvement in care  Outcome: Progressing     Problem: Depression  Goal: Refrain from isolation  Description: Interventions:  - Develop a trusting relationship   - Encourage socialization   Outcome: Progressing  Goal: Attend and participate in unit activities, including therapeutic, recreational, and educational groups  Description: Interventions:  - Provide therapeutic and educational activities daily, encourage attendance and participation, and document same in the medical record   Outcome: Progressing  Goal: Complete daily ADLs, including personal hygiene independently, as able  Description: Interventions:  - Observe, teach, and assist patient with ADLS  -  Monitor and promote a balance of rest/activity, with adequate nutrition and elimination   Outcome: Progressing

## 2021-02-09 NOTE — DISCHARGE INSTR - APPOINTMENTS
Day Perkins RN, our [employer], will be calling you after your discharge, on the phone number that you provided  She will be available as an additional support, if needed  If you wish to speak with her, you may contact Luis Holloway at [phone]

## 2021-02-10 PROBLEM — F15.10 METHAMPHETAMINE ABUSE (HCC): Status: ACTIVE | Noted: 2021-02-10

## 2021-02-10 PROCEDURE — 99232 SBSQ HOSP IP/OBS MODERATE 35: CPT | Performed by: STUDENT IN AN ORGANIZED HEALTH CARE EDUCATION/TRAINING PROGRAM

## 2021-02-10 RX ADMIN — NICOTINE POLACRILEX 2 MG: 2 GUM, CHEWING BUCCAL at 18:25

## 2021-02-10 RX ADMIN — BUSPIRONE HYDROCHLORIDE 15 MG: 15 TABLET ORAL at 21:18

## 2021-02-10 RX ADMIN — CLONAZEPAM 1 MG: 1 TABLET ORAL at 21:18

## 2021-02-10 RX ADMIN — NICOTINE POLACRILEX 2 MG: 2 GUM, CHEWING BUCCAL at 11:53

## 2021-02-10 RX ADMIN — BUSPIRONE HYDROCHLORIDE 15 MG: 15 TABLET ORAL at 08:32

## 2021-02-10 RX ADMIN — BUPROPION HYDROCHLORIDE 300 MG: 300 TABLET, FILM COATED, EXTENDED RELEASE ORAL at 08:32

## 2021-02-10 RX ADMIN — NICOTINE POLACRILEX 2 MG: 2 GUM, CHEWING BUCCAL at 14:12

## 2021-02-10 RX ADMIN — CLONAZEPAM 1 MG: 1 TABLET ORAL at 08:32

## 2021-02-10 RX ADMIN — NICOTINE POLACRILEX 2 MG: 2 GUM, CHEWING BUCCAL at 16:15

## 2021-02-10 RX ADMIN — CLONAZEPAM 1 MG: 1 TABLET ORAL at 15:41

## 2021-02-10 RX ADMIN — NICOTINE 1 PATCH: 21 PATCH, EXTENDED RELEASE TRANSDERMAL at 08:33

## 2021-02-10 RX ADMIN — NICOTINE POLACRILEX 2 MG: 2 GUM, CHEWING BUCCAL at 20:51

## 2021-02-10 RX ADMIN — TRAZODONE HYDROCHLORIDE 50 MG: 50 TABLET ORAL at 21:18

## 2021-02-10 RX ADMIN — RISPERIDONE 3 MG: 2 TABLET ORAL at 21:18

## 2021-02-10 RX ADMIN — NICOTINE POLACRILEX 2 MG: 2 GUM, CHEWING BUCCAL at 08:32

## 2021-02-10 RX ADMIN — BUSPIRONE HYDROCHLORIDE 15 MG: 15 TABLET ORAL at 15:41

## 2021-02-10 NOTE — PROGRESS NOTES
Progress Note - Behavioral Health   Shilo Nino 31 y o  male MRN: [id number]  Unit/Bed#: [facility] 258-02 Encounter: [id number]    Assessment/Plan   Principal Problem:    Schizoaffective disorder ([address] )  Active Problems:    High cholesterol    Medical clearance for psychiatric admission    Tobacco abuse      Subjective: Patient was seen, chart reviewed and case discussed with team  As per report patient slammed the doors yesterday  Patient appears clean and less irritable today  Depression and anxiety has improved and more future oriented today  Denies SI/HI/AH/VH  Denies any mood swings or irritability     Psychiatric Review of Systems:  Behavior over the last 24 hours:  improved  Sleep: normal  Appetite: normal  Medication side effects: No  ROS: no complaints, all others negative    Current Medications:  Current Facility-Administered Medications   Medication Dose Route Frequency    acetaminophen (TYLENOL) tablet 325 mg  325 mg Oral Q6H PRN    acetaminophen (TYLENOL) tablet 650 mg  650 mg Oral Q4H PRN    aluminum-magnesium hydroxide-simethicone (MYLANTA) oral suspension 30 mL  30 mL Oral Q4H PRN    atorvastatin (LIPITOR) tablet 10 mg  10 mg Oral Daily With Dinner    benztropine (COGENTIN) injection 1 mg  1 mg Intramuscular Q6H PRN    benztropine (COGENTIN) tablet 1 mg  1 mg Oral Q6H PRN    buPROPion (WELLBUTRIN XL) 24 hr tablet 300 mg  300 mg Oral Daily    busPIRone (BUSPAR) tablet 15 mg  15 mg Oral TID    clonazePAM (KlonoPIN) tablet 1 mg  1 mg Oral TID    haloperidol (HALDOL) tablet 5 mg  5 mg Oral Q6H PRN    haloperidol lactate (HALDOL) injection 5 mg  5 mg Intramuscular Q6H PRN    hydrOXYzine HCL (ATARAX) tablet 25 mg  25 mg Oral Q6H PRN    hydrOXYzine HCL (ATARAX) tablet 50 mg  50 mg Oral Q6H PRN    ibuprofen (MOTRIN) tablet 600 mg  600 mg Oral Q6H PRN    LORazepam (ATIVAN) injection 2 mg  2 mg Intramuscular Q6H PRN    LORazepam (ATIVAN) tablet 1 mg  1 mg Oral Q6H PRN    magnesium hydroxide (MILK OF MAGNESIA) oral suspension 30 mL  30 mL Oral Daily PRN    nicotine (NICODERM CQ) 21 mg/24 hr TD 24 hr patch 1 patch  1 patch Transdermal Daily    nicotine polacrilex (NICORETTE) gum 2 mg  2 mg Oral Q2H PRN    OLANZapine (ZyPREXA) IM injection 10 mg  10 mg Intramuscular Q8H PRN    OLANZapine (ZyPREXA) tablet 10 mg  10 mg Oral Q8H PRN    risperiDONE (RisperDAL M-TABS) dispersible tablet 1 mg  1 mg Oral Q3H PRN    risperiDONE (RisperDAL) tablet 3 mg  3 mg Oral HS    traZODone (DESYREL) tablet 50 mg  50 mg Oral HS PRN    traZODone (DESYREL) tablet 50 mg  50 mg Oral HS       Behavioral Health Medications: all current active meds have been reviewed  Vitals:  Vitals:    02/10/21 0726   BP: 94/60   Pulse: 87   Resp: 16   Temp: (!) 97 3 °F (36 3 °C)   SpO2:        Laboratory results:  I have personally reviewed all pertinent laboratory/tests results  Mental Status Evaluation:  Appearance:  age appropriate and casually dressed   Behavior:  guarded   Speech:  normal pitch and normal volume   Mood:  anxious   Affect:  constricted   Language Appropriate   Thought Process:  goal directed and logical   Thought Content:  normal   Perceptual Disturbances: None   Risk Potential: Suicidal Ideations none, Homicidal Ideations none and Potential for Aggression No   Sensorium:  person, place, time/date, situation, day of week, month of year and year   Cognition:  recent and remote memory grossly intact   Consciousness:  alert    Attention: attention span appeared shorter than expected for age   Insight:  fair   Judgment: fair   Gait/Station: normal gait/station   Motor Activity: no abnormal movements     Progress Toward Goals: Progressing    Recommended Treatment: Continue with pharmacotherapy, group therapy, milieu therapy and occupational therapy      1  Discharge planning for tomorow  Risks, benefits and possible side effects of Medications:   Risks, benefits, and possible side effects of medications explained to patient and patient verbalizes understanding
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
## 2021-02-10 NOTE — PLAN OF CARE
Problem: Alteration in Thoughts and Perception  Goal: Refrain from acting on delusional thinking/internal stimuli  Description: Interventions:  - Monitor patient closely, per order   - Utilize least restrictive measures   - Set reasonable limits, give positive feedback for acceptable   - Administer medications as ordered and monitor of potential side effects  Outcome: Progressing  Goal: Agree to be compliant with medication regime, as prescribed and report medication side effects  Description: Interventions:  - Offer appropriate PRN medication and supervise ingestion; conduct AIMS, as needed   Outcome: Progressing  Goal: Attend and participate in unit activities, including therapeutic, recreational, and educational groups  Description: Interventions:  -Encourage Visitation and family involvement in care  Outcome: Progressing     Problem: Depression  Goal: Refrain from isolation  Description: Interventions:  - Develop a trusting relationship   - Encourage socialization   Outcome: Progressing  Goal: Attend and participate in unit activities, including therapeutic, recreational, and educational groups  Description: Interventions:  - Provide therapeutic and educational activities daily, encourage attendance and participation, and document same in the medical record   Outcome: Progressing  Goal: Complete daily ADLs, including personal hygiene independently, as able  Description: Interventions:  - Observe, teach, and assist patient with ADLS  -  Monitor and promote a balance of rest/activity, with adequate nutrition and elimination   Outcome: Progressing     Problem: Anxiety  Goal: Anxiety is at manageable level  Description: Interventions:  - Assess and monitor patient's anxiety level  - Monitor for signs and symptoms (heart palpitations, chest pain, shortness of breath, headaches, nausea, feeling jumpy, restlessness, irritable, apprehensive)     - Collaborate with interdisciplinary team and initiate plan and interventions as ordered    - [place] patient to unit/surroundings  - Explain treatment plan  - Encourage participation in care  - Encourage verbalization of concerns/fears  - Identify coping mechanisms  - Assist in developing anxiety-reducing skills  - Administer/offer alternative therapies  - Limit or eliminate stimulants  Outcome: Progressing

## 2021-02-10 NOTE — PROGRESS NOTES
Pt calm and cooperative, pleasant during interaction  Pt denies any concerns and reports improved mood today  Pt slight brighter affect on approach  Remains mostly seclusive

## 2021-02-10 NOTE — PROGRESS NOTES
Schizoaffective disorder reviewed  Short term goals for decrease in depressive symptoms, decrease in anxiety symptoms, decrease in paranoid thoughts, decrease in psychotic symptoms, decrease in suicidal thoughts, decrease in self abusive behaviors discussed  Pt to be discharged on 2/11/2021, parents will pick him up  OP appointments scheduled  All parties in agreement and treatment plan signed        02/10/21 1240   Team Meeting   Meeting Type Tx Team Meeting   Team Members Present   Team Members Present Physician;Nurse;   Physician Team Member Dr Rayna Lopes Team Member [facility] Management Team Member Patsy   Patient/Family Present   Patient Present No (pt declined to meet with team)   Patient's Family Present No

## 2021-02-10 NOTE — PROGRESS NOTES
Pt calm and cooperative during interaction  Pt denies SI/HI  Pt reports the voice are "here and there"  Pt states if he doesn't focus on them, he doesn't know what they are saying  Contracts for safety on the unit  Reports anxiety is improved today due to the Klonopin  Pt utilzing the radio has a healthy coping mechanisms  Pt OOB for meals, social with peers, and visible in the milieu  Denies any questions or concerns at this time
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
## 2021-02-11 VITALS
HEART RATE: 104 BPM | OXYGEN SATURATION: 96 % | HEIGHT: 70 IN | RESPIRATION RATE: 18 BRPM | TEMPERATURE: 96.4 F | BODY MASS INDEX: 24.85 KG/M2 | WEIGHT: 173.6 LBS | SYSTOLIC BLOOD PRESSURE: 112 MMHG | DIASTOLIC BLOOD PRESSURE: 71 MMHG

## 2021-02-11 PROCEDURE — 99239 HOSP IP/OBS DSCHRG MGMT >30: CPT | Performed by: PHYSICIAN ASSISTANT

## 2021-02-11 RX ORDER — RISPERIDONE 3 MG/1
3 TABLET, FILM COATED ORAL
Qty: 30 TABLET | Refills: 0 | Status: SHIPPED | OUTPATIENT
Start: 2021-02-11 | End: 2021-07-09 | Stop reason: HOSPADM

## 2021-02-11 RX ADMIN — NICOTINE POLACRILEX 2 MG: 2 GUM, CHEWING BUCCAL at 08:57

## 2021-02-11 RX ADMIN — CLONAZEPAM 1 MG: 1 TABLET ORAL at 08:33

## 2021-02-11 RX ADMIN — BUPROPION HYDROCHLORIDE 300 MG: 300 TABLET, FILM COATED, EXTENDED RELEASE ORAL at 08:33

## 2021-02-11 RX ADMIN — BUSPIRONE HYDROCHLORIDE 15 MG: 15 TABLET ORAL at 08:33

## 2021-02-11 NOTE — DISCHARGE INSTRUCTIONS
Methamphetamine Abuse   WHAT YOU NEED TO KNOW:   Methamphetamine (meth) abuse is any use of meth, or needing more meth for the same effects you got from smaller amounts  DISCHARGE INSTRUCTIONS:   Seek care immediately if:   · You have chest pain, and your heartbeat or breathing is faster than usual     · You are so nervous that you cannot function  · You feel sick or vomit, or have headaches or trouble breathing while being around or cooking meth  You may also feel dizzy  · Children or others who have been near meth look or act ill, or will not wake up  · You have a seizure  · You want to hurt yourself or someone else  Contact your healthcare provider if:   · You have a fever  · You are using meth and know or think you may be pregnant  · You have withdrawal symptoms and want to start using meth again  · You have questions or concerns about your condition or care  Medicines:   · Medicines  may be given to help you stay calm, reduce depression, or decrease false thoughts  · Take your medicine as directed  Contact your healthcare provider if you think your medicine is not helping or if you have side effects  Tell him or her if you are allergic to any medicine  Keep a list of the medicines, vitamins, and herbs you take  Include the amounts, and when and why you take them  Bring the list or the pill bottles to follow-up visits  Carry your medicine list with you in case of an emergency  Long-term effects of meth abuse:   · Memory and concentration problems  can make it hard to learn or remember information  You may feel confused  You may also do things more slowly than before  · Behavior problems  may include violent or impulsive actions  Impulsive means you act without thinking first      · Physical problems  include heart weakness or damage  Your heart may have trouble working correctly  Men may have a decreased ability to have sex      · Self-care problems  include not keeping yourself clean and not eating properly because you are focused on using meth  Meth may cause you to look older than you really are  · Skin problems  may happen if you start picking at your skin or do not care for needle marks  You may think you see or feel bugs on or under your skin and try to pick them off  Skin picking causes sores to grow, and the sores can get infected  Meth injection causes needle marks on your skin  Needle marks can also get infected  · Mouth problems  can develop from meth use  Meth can cause dry mouth and make you chew, clench, or grind your teeth more than normal  This causes your teeth to wear down  Your teeth may turn dark or black  They may break, crumble, or fall apart  Your teeth may need to be pulled out  Dangers of cooking meth:  Meth is cooked from chemicals and materials that can cause a fire or explosion  These substances can cause severe burns  Do not use meth with other drugs:  Meth is more harmful when you combine it with alcohol or other drugs, such as cannabis or cocaine  This can be life-threatening  Prevent the spread of disease: Your risk for HIV, hepatitis, and other sexually transmitted infections (STIs) is increased  You may have sex more often with different people  You may be less likely to practice safe sex, such as using a condom  People who inject meth often share used or dirty needles  How meth affects unborn or  babies and children:   · If you are pregnant and use meth, your baby may not grow in your womb as he should  He may be born too early or die before birth  Your baby may have problems with his heart, brain, or body development  Do not breastfeed your baby if you use meth  You will give meth to your baby through your breast milk  Ask healthcare providers for more information about treatment programs and drug use while breastfeeding  · Your child may not grow as he should  He also may have trouble learning or managing anger      Meth withdrawal:  Withdrawal occurs when you decrease or stop using a drug you are addicted to  Meth users may have trouble coping with the symptoms of withdrawal and may start using meth again  Withdrawal signs and symptoms go away in days to weeks after you stop using meth  Meth withdrawal can cause the following signs and symptoms:  · Seizures    · Feeling sad or wanting to kill yourself    · Strong cravings for meth    · Feeling tired, sleeping longer than usual or not being able sleep at all, or bad dreams    · Trouble focusing on a task, moving more slowly and taking longer to complete tasks, or feeling restless    · Feeling nervous, angry, hungry, or unwell, or thinking people are trying to hurt you    Meth abuse treatment:   · Contingency management  is a program to help meth users stop using drugs by giving rewards  You may be rewarded for staying in treatment or giving drug-free urine samples  You may get rewards for having STI or tuberculosis tests, or getting vaccines to help decrease the spread of disease  Rewards may include vouchers to buy food  · Cognitive behavioral therapy (CBT)  helps you change your thinking and behavior  It can help you manage depression and anxiety caused by meth use  CBT can help you learn good coping skills and ways to manage stress  CBT can be done with you and a talk therapist or in a group with others  · Family therapy and support groups  are meetings with a talk therapist and other people who have used meth or other drugs  Your friends and family may be asked to attend treatment sessions with you  Programs near where you live may support your choice to quit using drugs  Ask your healthcare provider for information about programs in your town  · Harm reduction  is a program to help support your choice to prevent spreading disease  You may be able to return used needles and syringes and replace them with clean ones      Follow up with your healthcare provider as directed:  Write down your questions so you remember to ask them during your visits  © Copyright [address] Information is for End User's use only and may not be sold, redistributed or otherwise used for commercial purposes  All illustrations and images included in CareNotes® are the copyrighted property of [employer]  or Marito Chacon   The above information is an  only  It is not intended as medical advice for individual conditions or treatments  Talk to your doctor, nurse or pharmacist before following any medical regimen to see if it is safe and effective for you  Schizoaffective Disorder   WHAT YOU NEED TO KNOW:   Schizoaffective disorder is a long-term mental illness that may change how you think, feel, and act around others  You may not know what is real and what is not real    DISCHARGE INSTRUCTIONS:   Medicines:   · Antipsychotics: These medicines help decrease psychotic symptoms or severe agitation  You may need antiparkinson medicine to control muscle stiffness, twitches, and restlessness caused by antipsychotic medicines  · Antianxiety medicine: This medicine may be given to decrease anxiety and help you feel calm and relaxed  · Antidepressants: These medicines are given to decrease or stop the symptoms of depression, anxiety, and behavior problems  · Mood stabilizers: These medicines help control mood swings  · Anticonvulsants: This medicine is given to control seizures  It may also be used to decrease violent behavior and control your mood swings  · Blood pressure medicines: These may be used to help decrease motor tics (uncontrolled movements)  They may also help you feel calmer, more focused, and less irritable  · Anticholinergics: This medicine decreases the side effects of other medicines  · Take your medicine as directed  Contact your healthcare provider if you think your medicine is not helping or if you have side effects   Tell him or her if you are allergic to any medicine  Keep a list of the medicines, vitamins, and herbs you take  Include the amounts, and when and why you take them  Bring the list or the pill bottles to follow-up visits  Carry your medicine list with you in case of an emergency  Follow up with your healthcare provider or psychiatrist as directed: You may need to return to have your blood pressure and other symptoms checked  You may need blood tests to check the level of medicine in your blood  Write down your questions so you remember to ask them during your visits  Manage your symptoms: The following may help you feel better or prevent symptoms of schizoaffective disorder from coming back:  · Find support for yourself and your family:  Talk with others to help you cope with your illness better  This may also help to improve how you relate to others  · Keep all medical appointments: This will help manage your disease and the side-effects from medicines you may be taking  · Use your medicines as directed:  Put your medicines in a pillbox placed in an area you can easily see  Use a watch with an alarm to help you remember when it is time to take your medicine  Tell your healthcare provider if you know or think you might be pregnant  Do not stop taking your medicines without your healthcare provider's okay  A sudden stop can cause serious medical problems  · Watch for early signs of a relapse and seek help immediately:      ? How you think, feel, and see things has changed  ? You behave differently than usual     ? You become more nervous and upset, but do not know why     ? You eat less and have trouble sleeping  ? You have little or no interest in friends or activities  For support and more information:   · American [employer] Association  [address] [place] [address], [facility] Radha Sarah [address] , [address]elia [address]  Phone: 3- 163 - [phone]  Phone: 8- 422 - [phone]  Web Address: [employer] br  org    · [employer] [facility], Office of [employer], Planning, and Communications  [address] [facility], [address], [address]  [place] , [place] [address]   Phone: 8- [phone] - [phone]  Phone: 7- 372 - [phone]  Web Address: Francesca clark  Contact your healthcare provider or psychiatrist if:   · You think you are having a relapse  · You are having side effects from your medicine, or they are not helping  · You are not sleeping well or are sleeping more than usual     · You cannot eat or are eating more than usual     · You have muscle spasms, stiffness, or trouble walking  · Your sad feelings or thoughts change the way you function during the day  · You have questions or concerns about your condition or care  Seek care immediately or call 911 if:   · You feel like hurting or killing yourself or others  · You feel that your condition is getting worse  · You feel very upset, threaten someone, or you feel violent  · You suddenly have changes in your vision  · You suddenly have chest pain, trouble breathing, or a fever  © Copyright [address] Information is for End User's use only and may not be sold, redistributed or otherwise used for commercial purposes  All illustrations and images included in CareNotes® are the copyrighted property of A [employer] A M [employer] Inc  or [address] [place] [address]  The above information is an  only  It is not intended as medical advice for individual conditions or treatments  Talk to your doctor, nurse or pharmacist before following any medical regimen to see if it is safe and effective for you

## 2021-02-11 NOTE — BH TRANSITION RECORD
Contact Information: If you have any questions, concerns, pended studies, tests and/or procedures, or emergencies regarding your inpatient behavioral health visit  Please contact [facility] [phone] and ask to speak to a , nurse or physician  A contact is available 24 hours/ 7 days a week at this number  Summary of Procedures Performed During your Stay:  Below is a list of major procedures performed during your hospital stay and a summary of results:  - No major procedures performed  Pending Studies (From admission, onward)     Start     Ordered    02/10/21 0600  Hemoglobin A1C  Morning draw      02/09/21 1351    02/10/21 0600  Lipid panel  Morning draw      02/09/21 1351    02/10/21 0600  RPR   Morning draw      02/09/21 1351    02/10/21 0600  TSH, 3rd generation with Free T4 reflex  Morning draw      02/09/21 1351    02/10/21 0600  CBC and differential  Morning draw      02/09/21 1352    02/10/21 0600  Comprehensive metabolic panel  Morning draw      02/09/21 1352              If studies are pending at discharge, follow up with your PCP and/or referring provider

## 2021-02-11 NOTE — NURSING NOTE
Pt calm and cooperative, pleasant during interaction  Reports feeling "about the same" since admission however is noticeably less irritable  Denies SI/HI/AVH  AVS reviewed and prescription given  Pt expresses understanding  Denies any questions or concerns  Pt discharged home via father

## 2021-02-15 NOTE — PROGRESS NOTES
Status: Pt denies SI, Cooperative throughout the day  Ready for dc today       Medication:  No changes / PRN: Nicorette Gum (X6)      D/C: Today - parents will  around 10:30am        02/11/21 0855   Team Meeting   Meeting Type Daily Rounds   Team Members Present   Team Members Present Physician;Nurse;;Occupational Therapist   Physician Team Member Dr Matilda Ryan / Debbie Bring Team Member Niall Johns / Reyes Verduzco Management Team Member Manda Mendes / Tod Bradshaw    OT Team Member Erickson Herrmann / Kiesha Muñoz   Patient/Family Present   Patient Present No   Patient's Family Present No

## 2021-04-05 ENCOUNTER — TRANSCRIBE ORDERS (OUTPATIENT)
Dept: ADMINISTRATIVE | Facility: HOSPITAL | Age: 31
End: 2021-04-05

## 2021-04-05 DIAGNOSIS — Z87.820 PERSONAL HISTORY OF TRAUMATIC BRAIN INJURY: Primary | ICD-10-CM

## 2021-04-29 ENCOUNTER — IMMUNIZATIONS (OUTPATIENT)
Dept: FAMILY MEDICINE CLINIC | Facility: HOSPITAL | Age: 31
End: 2021-04-29

## 2021-04-29 DIAGNOSIS — Z23 ENCOUNTER FOR IMMUNIZATION: Primary | ICD-10-CM

## 2021-04-29 PROCEDURE — 91300 SARS-COV-2 / COVID-19 MRNA VACCINE (PFIZER-BIONTECH) 30 MCG: CPT

## 2021-04-29 PROCEDURE — 0001A SARS-COV-2 / COVID-19 MRNA VACCINE (PFIZER-BIONTECH) 30 MCG: CPT

## 2021-05-09 ENCOUNTER — HOSPITAL ENCOUNTER (OUTPATIENT)
Dept: MRI IMAGING | Facility: HOSPITAL | Age: 31
Discharge: HOME | End: 2021-05-09

## 2021-05-09 DIAGNOSIS — Z87.820 PERSONAL HISTORY OF TRAUMATIC BRAIN INJURY: ICD-10-CM

## 2021-05-24 ENCOUNTER — IMMUNIZATIONS (OUTPATIENT)
Dept: FAMILY MEDICINE CLINIC | Facility: HOSPITAL | Age: 31
End: 2021-05-24

## 2021-05-24 DIAGNOSIS — Z23 ENCOUNTER FOR IMMUNIZATION: Primary | ICD-10-CM

## 2021-05-24 PROCEDURE — 0002A SARS-COV-2 / COVID-19 MRNA VACCINE (PFIZER-BIONTECH) 30 MCG: CPT

## 2021-05-24 PROCEDURE — 91300 SARS-COV-2 / COVID-19 MRNA VACCINE (PFIZER-BIONTECH) 30 MCG: CPT

## 2021-07-07 ENCOUNTER — HOSPITAL ENCOUNTER (EMERGENCY)
Facility: HOSPITAL | Age: 31
End: 2021-07-08
Attending: EMERGENCY MEDICINE | Admitting: EMERGENCY MEDICINE
Payer: COMMERCIAL

## 2021-07-07 DIAGNOSIS — R45.851 SUICIDAL IDEATION: ICD-10-CM

## 2021-07-07 DIAGNOSIS — F20.9 SCHIZOPHRENIA (HCC): ICD-10-CM

## 2021-07-07 DIAGNOSIS — F25.9 SCHIZOAFFECTIVE DISORDER, UNSPECIFIED TYPE (HCC): Primary | ICD-10-CM

## 2021-07-07 PROCEDURE — 99285 EMERGENCY DEPT VISIT HI MDM: CPT | Performed by: EMERGENCY MEDICINE

## 2021-07-07 PROCEDURE — 82075 ASSAY OF BREATH ETHANOL: CPT | Performed by: EMERGENCY MEDICINE

## 2021-07-07 PROCEDURE — 80307 DRUG TEST PRSMV CHEM ANLYZR: CPT | Performed by: EMERGENCY MEDICINE

## 2021-07-07 PROCEDURE — U0005 INFEC AGEN DETEC AMPLI PROBE: HCPCS | Performed by: EMERGENCY MEDICINE

## 2021-07-07 PROCEDURE — 96372 THER/PROPH/DIAG INJ SC/IM: CPT

## 2021-07-07 PROCEDURE — 93005 ELECTROCARDIOGRAM TRACING: CPT

## 2021-07-07 PROCEDURE — 99285 EMERGENCY DEPT VISIT HI MDM: CPT

## 2021-07-07 PROCEDURE — U0003 INFECTIOUS AGENT DETECTION BY NUCLEIC ACID (DNA OR RNA); SEVERE ACUTE RESPIRATORY SYNDROME CORONAVIRUS 2 (SARS-COV-2) (CORONAVIRUS DISEASE [COVID-19]), AMPLIFIED PROBE TECHNIQUE, MAKING USE OF HIGH THROUGHPUT TECHNOLOGIES AS DESCRIBED BY CMS-2020-01-R: HCPCS | Performed by: EMERGENCY MEDICINE

## 2021-07-07 RX ORDER — DIAPER,BRIEF,INFANT-TODD,DISP
EACH MISCELLANEOUS 3 TIMES DAILY
Status: DISCONTINUED | OUTPATIENT
Start: 2021-07-07 | End: 2021-07-08 | Stop reason: HOSPADM

## 2021-07-07 RX ORDER — BUPRENORPHINE AND NALOXONE 2; .5 MG/1; MG/1
4 FILM, SOLUBLE BUCCAL; SUBLINGUAL ONCE
Status: COMPLETED | OUTPATIENT
Start: 2021-07-07 | End: 2021-07-07

## 2021-07-07 RX ORDER — IBUPROFEN 400 MG/1
800 TABLET ORAL EVERY 8 HOURS PRN
Status: CANCELLED | OUTPATIENT
Start: 2021-07-07

## 2021-07-07 RX ORDER — LORAZEPAM 1 MG/1
1 TABLET ORAL ONCE
Status: COMPLETED | OUTPATIENT
Start: 2021-07-07 | End: 2021-07-07

## 2021-07-07 RX ORDER — OLANZAPINE 10 MG/1
10 INJECTION, POWDER, LYOPHILIZED, FOR SOLUTION INTRAMUSCULAR
Status: CANCELLED | OUTPATIENT
Start: 2021-07-07

## 2021-07-07 RX ORDER — HALOPERIDOL 5 MG/ML
5 INJECTION INTRAMUSCULAR ONCE
Status: COMPLETED | OUTPATIENT
Start: 2021-07-07 | End: 2021-07-07

## 2021-07-07 RX ORDER — DIAPER,BRIEF,INFANT-TODD,DISP
EACH MISCELLANEOUS 4 TIMES DAILY PRN
Status: DISCONTINUED | OUTPATIENT
Start: 2021-07-07 | End: 2021-07-07

## 2021-07-07 RX ORDER — IBUPROFEN 400 MG/1
400 TABLET ORAL EVERY 4 HOURS PRN
Status: CANCELLED | OUTPATIENT
Start: 2021-07-07

## 2021-07-07 RX ORDER — LORAZEPAM 1 MG/1
2 TABLET ORAL ONCE
Status: COMPLETED | OUTPATIENT
Start: 2021-07-07 | End: 2021-07-07

## 2021-07-07 RX ORDER — OLANZAPINE 2.5 MG/1
5 TABLET ORAL
Status: CANCELLED | OUTPATIENT
Start: 2021-07-07

## 2021-07-07 RX ORDER — OLANZAPINE 2.5 MG/1
10 TABLET ORAL
Status: CANCELLED | OUTPATIENT
Start: 2021-07-07

## 2021-07-07 RX ORDER — IBUPROFEN 600 MG/1
600 TABLET ORAL EVERY 6 HOURS PRN
Status: CANCELLED | OUTPATIENT
Start: 2021-07-07

## 2021-07-07 RX ORDER — DIPHENHYDRAMINE HYDROCHLORIDE 50 MG/ML
25 INJECTION INTRAMUSCULAR; INTRAVENOUS ONCE
Status: COMPLETED | OUTPATIENT
Start: 2021-07-07 | End: 2021-07-07

## 2021-07-07 RX ORDER — OLANZAPINE 10 MG/1
5 INJECTION, POWDER, LYOPHILIZED, FOR SOLUTION INTRAMUSCULAR
Status: CANCELLED | OUTPATIENT
Start: 2021-07-07

## 2021-07-07 RX ORDER — GINSENG 100 MG
1 CAPSULE ORAL ONCE
Status: COMPLETED | OUTPATIENT
Start: 2021-07-07 | End: 2021-07-07

## 2021-07-07 RX ORDER — OLANZAPINE 2.5 MG/1
2.5 TABLET ORAL
Status: CANCELLED | OUTPATIENT
Start: 2021-07-07

## 2021-07-07 RX ORDER — BENZTROPINE MESYLATE 0.5 MG/1
1 TABLET ORAL
Status: CANCELLED | OUTPATIENT
Start: 2021-07-07

## 2021-07-07 RX ADMIN — BUPRENORPHINE AND NALOXONE 4 MG: 2; .5 FILM BUCCAL; SUBLINGUAL at 12:07

## 2021-07-07 RX ADMIN — HYDROCORTISONE 1 APPLICATION: 1 CREAM TOPICAL at 12:09

## 2021-07-07 RX ADMIN — DIPHENHYDRAMINE HYDROCHLORIDE 25 MG: 50 INJECTION, SOLUTION INTRAMUSCULAR; INTRAVENOUS at 19:57

## 2021-07-07 RX ADMIN — LORAZEPAM 1 MG: 1 TABLET ORAL at 18:02

## 2021-07-07 RX ADMIN — LORAZEPAM 2 MG: 1 TABLET ORAL at 11:27

## 2021-07-07 RX ADMIN — LORAZEPAM 1 MG: 1 TABLET ORAL at 17:49

## 2021-07-07 RX ADMIN — BACITRACIN 1 SMALL APPLICATION: 500 OINTMENT TOPICAL at 12:19

## 2021-07-07 RX ADMIN — NICOTINE POLACRILEX 2 MG: 2 GUM, CHEWING BUCCAL at 15:12

## 2021-07-07 RX ADMIN — HALOPERIDOL LACTATE 5 MG: 5 INJECTION, SOLUTION INTRAMUSCULAR at 19:57
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
## 2021-07-07 NOTE — ED NOTES
201 prepared and sent to RN for signatures      Christine Lewis, [facility]  07/07/21  9709

## 2021-07-07 NOTE — ED NOTES
Per Kerline Andrade at [facility], patient was denied, stating he is too acute with meth use for their dual unit

## 2021-07-07 NOTE — ED NOTES
Insurance Authorization for admission:   Phone call placed to [facility]  Phone number: [phone]  Spoke to Gurvidner Shepherd  3 days approved  Level of care: Acute Inpt  (201)  Review on 07/10/2021  Authorization # to be obtained by accepting facility upon arrival         EVS (Eligibility Verification System) called - [phone]  Automated system indicates: Active with Nora Nolasco (ID# [id number])    Insurance Authorization for Transportation: To be arranged through [facility], unless coordinated with BAILEE Hartman  07/07/21 2001

## 2021-07-07 NOTE — ED NOTES
Dual bed search initiated to following facilities:     [place]: Spoke with [address] [place] [address], beds available; chart faxed for review  [facility]: Spoke with [facility], no beds available  [facility]: Spoke with [facility], no beds available  [facility]: Spoke with [facility], possible bed available; chart faxed for review       John Lizarraga, [facility]  07/07/21   6040

## 2021-07-07 NOTE — ED NOTES
Call received from [place] [facility] [place] stating they no longer have a bed available       BAILEE Ibrahim  07/07/21   1323

## 2021-07-07 NOTE — ED NOTES
Pt's room emptied and pt placed in paper scrubs  Pt belongings collected and secured at secure [facility] nursing station        Nadeem Bagley RN  07/07/21 0553
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
## 2021-07-07 NOTE — ED NOTES
Pt refused hydrocortisone cream at this time  Pt request one of his medications he takes 3x/daily  Doctor notified        Angelica Zafar RN  07/07/21 8258

## 2021-07-08 ENCOUNTER — HOSPITAL ENCOUNTER (INPATIENT)
Facility: HOSPITAL | Age: 31
LOS: 1 days | Discharge: HOME | DRG: 750 | End: 2021-07-09
Attending: STUDENT IN AN ORGANIZED HEALTH CARE EDUCATION/TRAINING PROGRAM | Admitting: PSYCHIATRY & NEUROLOGY
Payer: COMMERCIAL

## 2021-07-08 VITALS
BODY MASS INDEX: 23.62 KG/M2 | HEART RATE: 82 BPM | RESPIRATION RATE: 14 BRPM | HEIGHT: 70 IN | TEMPERATURE: 97.8 F | SYSTOLIC BLOOD PRESSURE: 118 MMHG | DIASTOLIC BLOOD PRESSURE: 67 MMHG | WEIGHT: 165 LBS | OXYGEN SATURATION: 97 %

## 2021-07-08 DIAGNOSIS — F25.9 SCHIZOAFFECTIVE DISORDER, UNSPECIFIED TYPE (HCC): ICD-10-CM

## 2021-07-08 DIAGNOSIS — F41.1 GAD (GENERALIZED ANXIETY DISORDER): Primary | ICD-10-CM

## 2021-07-08 LAB
ATRIAL RATE: 74 BPM
P AXIS: 60 DEGREES
PR INTERVAL: 126 MS
QRS AXIS: 63 DEGREES
QRSD INTERVAL: 96 MS
QT INTERVAL: 372 MS
QTC INTERVAL: 412 MS
T WAVE AXIS: 53 DEGREES
VENTRICULAR RATE: 74 BPM

## 2021-07-08 PROCEDURE — 99253 IP/OBS CNSLTJ NEW/EST LOW 45: CPT | Performed by: PSYCHIATRY & NEUROLOGY

## 2021-07-08 PROCEDURE — 99254 IP/OBS CNSLTJ NEW/EST MOD 60: CPT | Performed by: PHYSICIAN ASSISTANT

## 2021-07-08 PROCEDURE — 93010 ELECTROCARDIOGRAM REPORT: CPT | Performed by: INTERNAL MEDICINE

## 2021-07-08 RX ORDER — BENZTROPINE MESYLATE 1 MG/1
1 TABLET ORAL
Status: DISCONTINUED | OUTPATIENT
Start: 2021-07-08 | End: 2021-07-09 | Stop reason: HOSPADM

## 2021-07-08 RX ORDER — CLONAZEPAM 1 MG/1
1 TABLET ORAL 3 TIMES DAILY
Status: DISCONTINUED | OUTPATIENT
Start: 2021-07-08 | End: 2021-07-08

## 2021-07-08 RX ORDER — OLANZAPINE 10 MG/1
5 INJECTION, POWDER, LYOPHILIZED, FOR SOLUTION INTRAMUSCULAR
Status: DISCONTINUED | OUTPATIENT
Start: 2021-07-08 | End: 2021-07-09 | Stop reason: HOSPADM

## 2021-07-08 RX ORDER — CLONAZEPAM 1 MG/1
1 TABLET ORAL 3 TIMES DAILY PRN
Status: DISCONTINUED | OUTPATIENT
Start: 2021-07-08 | End: 2021-07-09 | Stop reason: HOSPADM

## 2021-07-08 RX ORDER — IBUPROFEN 600 MG/1
600 TABLET ORAL EVERY 6 HOURS PRN
Status: DISCONTINUED | OUTPATIENT
Start: 2021-07-08 | End: 2021-07-09 | Stop reason: HOSPADM

## 2021-07-08 RX ORDER — BUSPIRONE HYDROCHLORIDE 10 MG/1
10 TABLET ORAL 2 TIMES DAILY
Status: DISCONTINUED | OUTPATIENT
Start: 2021-07-08 | End: 2021-07-09 | Stop reason: HOSPADM

## 2021-07-08 RX ORDER — OLANZAPINE 10 MG/1
10 TABLET ORAL
Status: DISCONTINUED | OUTPATIENT
Start: 2021-07-08 | End: 2021-07-09 | Stop reason: HOSPADM

## 2021-07-08 RX ORDER — IBUPROFEN 400 MG/1
400 TABLET ORAL EVERY 4 HOURS PRN
Status: DISCONTINUED | OUTPATIENT
Start: 2021-07-08 | End: 2021-07-09 | Stop reason: HOSPADM

## 2021-07-08 RX ORDER — RISPERIDONE 1 MG/1
1 TABLET, FILM COATED ORAL 2 TIMES DAILY
Status: DISCONTINUED | OUTPATIENT
Start: 2021-07-08 | End: 2021-07-09 | Stop reason: HOSPADM

## 2021-07-08 RX ORDER — OLANZAPINE 10 MG/1
10 INJECTION, POWDER, LYOPHILIZED, FOR SOLUTION INTRAMUSCULAR
Status: DISCONTINUED | OUTPATIENT
Start: 2021-07-08 | End: 2021-07-09 | Stop reason: HOSPADM

## 2021-07-08 RX ORDER — IBUPROFEN 800 MG/1
800 TABLET ORAL EVERY 8 HOURS PRN
Status: DISCONTINUED | OUTPATIENT
Start: 2021-07-08 | End: 2021-07-09 | Stop reason: HOSPADM

## 2021-07-08 RX ORDER — OLANZAPINE 5 MG/1
5 TABLET ORAL
Status: DISCONTINUED | OUTPATIENT
Start: 2021-07-08 | End: 2021-07-09 | Stop reason: HOSPADM

## 2021-07-08 RX ORDER — OLANZAPINE 2.5 MG/1
2.5 TABLET ORAL
Status: DISCONTINUED | OUTPATIENT
Start: 2021-07-08 | End: 2021-07-09 | Stop reason: HOSPADM

## 2021-07-08 RX ADMIN — CLONAZEPAM 1 MG: 1 TABLET ORAL at 21:09

## 2021-07-08 RX ADMIN — BUSPIRONE HYDROCHLORIDE 10 MG: 10 TABLET ORAL at 17:28

## 2021-07-08 RX ADMIN — CLONAZEPAM 1 MG: 1 TABLET ORAL at 17:37

## 2021-07-08 RX ADMIN — RISPERIDONE 1 MG: 1 TABLET ORAL at 17:28

## 2021-07-08 NOTE — ED NOTES
Pt displays no outward signs of physical distress, even rise/fall of chest with respirations of 14 breaths per minute        Ismael Andres RN  07/08/21 3249

## 2021-07-08 NOTE — CONSULTS
[address] 1990, 27 y o  male MRN: [id number]  Unit/Bed#: [facility] 256-01 Encounter: [id number]  Primary Care Provider: Augie Beatty MD   Date and time admitted to hospital: 7/8/2021  1:36 AM    Inpatient consult for Medical Clearance for [facility] patient  Consult performed by: Rasheed Limon PA-C  Consult ordered by: Janie Urias MD          Medical clearance for psychiatric admission  Assessment & Plan    No admission labs available    No labs pending   UDS positive for methamphetamine and benzodiazepine   COVID-19 negative   EKG 12/14/16 reveals NSR 91bpm,    Medically stable for continued inpatient psychiatric treatment      Methamphetamine abuse ([facility] [address] [facility] )  Assessment & Plan  ·  Also with nonprescription Suboxone abuse  ·   on cessation    * Schiz[facility]ffective disorder ([facility] )  Assessment & Plan  ·  Management per Psychiatry      VTE Prophylaxis:   Low Risk (Score 0-2) - Encourage Ambulation  Recommendations for Discharge:  · Discharge timeline per primary team   Routine follow-up with primary care provider   on cessation from methamphetamine use  Counseling / Coordination of Care Time: 30 minutes Greater than 50% of total time spent on patient counseling and coordination of care  Collaboration of Care: Were Recommendations Directly Discussed with Primary Treatment Team? No    History of Present Illness:  Sanam Bullion is a 27 y o  male who is originally admitted to the behavioral health service due to hallucinations  We are consulted for management of chronic medical conditions  Patient denies any chronic medical conditions for which he takes daily medications  He does report he takes Klonopin outpatient 1 mg t i d  and is currently going through withdrawal  Patient should continue all prior to admission medications as prescribed by primary care provider/outpatient specialists    Patient denies recent travel, illness or sick contacts  Patient denies   Alcohol or tobacco use  He does admit to methamphetamine, Suboxone and Klonopin use outpatient  Available admission lab work and vitals are acceptable  Patient feels  he is currently going through withdrawal  Patient does have orders available to continue his Klonopin 1mg TID  Patient appears medically stable for inpatient psychiatric treatment at this time  Review of Systems:  Review of Systems   Psychiatric/Behavioral: Positive for hallucinations  All other systems reviewed and are negative  Past Medical and Surgical History:   Past Medical History:   Diagnosis Date    ADD (attention deficit disorder)     Depression     Drug abuse ([facility] )     Hallucination     Insomnia     Psychiatric illness     Schizophrenia ([facility] )        Past Surgical History:   Procedure Laterality Date    WISDOM TOOTH EXTRACTION         Meds/Allergies:  PTA meds:   Prior to Admission Medications   Prescriptions Last Dose Informant Patient Reported?  Taking?   atorvastatin (LIPITOR) 10 mg tablet Not Taking at Unknown time Self Yes No   Sig: Take 10 mg by mouth daily   Patient not taking: Reported on 2021   buPROPion (WELLBUTRIN XL) 300 mg 24 hr tablet Not Taking at Unknown time Self Yes No   Sig: daily    Patient not taking: Reported on 2021   busPIRone (BUSPAR) 15 mg tablet Not Taking at Unknown time Self Yes No   Sig: 3 (three) times a day    Patient not taking: Reported on 2021   clonazePAM (KLONOPIN PO) Not Taking at Unknown time Self Yes No   Sig: Take 1 mg by mouth 3 (three) times a day    Patient not taking: Reported on 2021   risperiDONE (RisperDAL) 3 mg tablet Not Taking at Unknown time  No No   Sig: Take 1 tablet (3 mg total) by mouth daily at bedtime   Patient not taking: Reported on 2021   traZODone (DESYREL) 50 mg tablet Not Taking at Unknown time Self Yes No   Si mg daily    Patient not taking: Reported on 2021      Facility-Administered Medications: None       Allergies: No Known Allergies    Social History:  Marital Status: Single  Substance Use History:   Social History     Substance and Sexual Activity   Alcohol Use No    Comment: Denies ANY alcohol use/abuse in the last year     Social History     Tobacco Use   Smoking Status Never Smoker   Smokeless Tobacco Never Used     Social History     Substance and Sexual Activity   Drug Use Yes    Types: Methamphetamines    Comment: Last used IV 3 days ago       Family History:  Family History   Problem Relation Age of Onset    Hypertension Mother     Hyperlipidemia Mother     Prostate cancer Father     Hyperlipidemia Father     Hypertension Maternal Grandmother     Uterine cancer Maternal Grandmother     Cancer Maternal Grandfather     Alcohol abuse Maternal Grandfather     Dementia Paternal Grandmother     Prostate cancer Paternal Grandfather     Lung cancer Paternal Grandfather     Seizures Neg Hx        Physical Exam:   Vitals:   Blood Pressure: (!) 87/50 (07/08/21 1024)  Pulse: 62 (07/08/21 1024)  Temperature: 97 7 °F (36 5 °C) (07/08/21 1024)  Temp Source: Tympanic (07/08/21 1024)  Respirations: 16 (07/08/21 1024)  Height: 5' 11 5" (181 6 cm) (07/08/21 0143)  Weight - Scale: 74 1 kg (163 lb 6 4 oz) (Waist 34") (07/08/21 0143)  SpO2: 96 % (07/08/21 0143)    Physical Exam  Vitals and nursing note reviewed  Constitutional:       General: He is awake  He is not in acute distress  HENT:      Head: Normocephalic and atraumatic  Cardiovascular:      Rate and Rhythm: Normal rate and regular rhythm  Heart sounds: No murmur heard  Pulmonary:      Effort: Pulmonary effort is normal       Breath sounds: Normal breath sounds  Abdominal:      General: There is no distension  Palpations: Abdomen is soft  Musculoskeletal:      Right lower leg: No edema  Left lower leg: No edema  Skin:     General: Skin is warm and dry  Neurological:      Mental Status: He is alert        Comments: CN II-XII grossly intact         Additional Data:   Lab Results:                    No results found for: HGBA1C            Imaging: No pertinent imaging reviewed  No orders to display       EKG, Pathology, and Other Studies Reviewed on Admission:   · EKG: NSR  HR 91bpm     ** Please Note: This note may have been constructed using a voice recognition system   **

## 2021-07-08 NOTE — CASE MANAGEMENT
Pt is a 26yo single male admitted on 7/8/21 0136 as a 12 from [facility] ED  CM met with pt in order to complete initial intake/assessment and pt presented as cooperative yet lethargic  Pt reports 11 past IP psych admissions, last at [facility] 02/2021  Pt reports he resides at [address] [facility] with parents and plans to return there upon d/c  Pt reports parents will d/c home     Pt signed BREANN for OP provider [facility] [phone]  CM contacted provider and notified of pt's admission  Pt had has f/u on 7/22 VIRTUAL med management at 3:40pm and 7/15 VIRTUAL 4pm for therapy, added to AVS      Pt signed BREANN for CM with Radha David [phone]  CM contacted Erendira Funes and provided status update  She stated pt had been non-compliant with his medication at home  She requested to be updated on pt's progress  CM provided pt with her number as well       Pt signed BREANN for PCP Dr Bishnu Carrera [phone]  CM contacted office and notified of admission  Pt signed BREANN for parents Walt Arcos and Vinnie Lopez [phone]  CM contacted and notified of admission      Pt AUDIT 0, UDS + Meth and benzo, PAWSS 0  Pt reports D&A issues as using meth, 1x weekly, IV, last use 7/6  Pt reports buying suboxone off the street taking 8mg daily orally, for the past couple of months, last use 7/1  Pt reports benzo's are prescribed by PCP  Pt reports 3 past D&A IP last in [place] a year ago  Pt reports utilizing AA/NA "a little bit" but that "I don't get much out of it"  Pt not open to a referral at this time  Pt is open to MAT referral pt stated he was seeing Dr Willow Peacock in [facility] [phone], signed BREANN, in the past and would like to go back there  CM contacted and they stated that they no longer work with [facility]  Primary CM to f/u to see if pt willing to be referred elsewhere  [facility] MH, CRISIS, and D&A resources placed in AVS      Pt declined interest in [facility] text reminder service  [address] home? Y/N with who Yes, parents    Access to firearms Pt denies    Supports Parents    Legal [place]   Education  GED some college   Employed? Y/N Where Unemloyed    Income Source/How much None       Pt denies stressors/barriers/triggers    Pt reports coping skills as "breathing"  Pt reports chief complaint as "hearing voices"

## 2021-07-08 NOTE — PLAN OF CARE
Nursing will meet with you q shift & prn to assess for suicidal ideation; homicidal ideation, as well as AH or other psychiatric s/s's  We will teach you about your illness & meds  & assist you in the development of alternate coping skills

## 2021-07-08 NOTE — ED NOTES
Pt to be picked up for transfer at 0100  Pt's mother called and updated on pt's placement and transfer time        Hawk Contreras RN  07/07/21 4317

## 2021-07-08 NOTE — DISCHARGE INSTR - APPOINTMENTS
Marilu Kumar RN, our [employer], will be calling you after your discharge, on the phone number that you provided  She will be available as an additional support, if needed  If you wish to speak with her, you may contact Amina Abreu at [phone]

## 2021-07-08 NOTE — DISCHARGE INSTR - OTHER ORDERS
Mental Health Resources in [address] [place] [address] is not simply the moment when things become intolerable  Crises build over time, and often can be recognized and managed in advance  [address] provides not only immediate support for crisis situations, but also assistance with managing recurring or future crises  Support is available 24 hours a day, 7 days a week at [phone] ([phone])  This service is available to anyone in [facility], including children, teens, adults, and families  STAGES OF CRISIS MANAGEMENT  Before a crisis  When you start to recognize the stressors that you or a loved one have felt during previous crises, please call MyraOcclutech Martinez peer support talk line at [phone]  It is available, free of charge, 7 days a week, 1:00pm to 9:00pm   [facility] also has a [employer] that can be reached by calling [phone] or texting [phone]  It is available Monday through Friday, 3:00pm to 9:00pm   During a crisis  When you or a loved one are experiencing a crisis, Mobile Crisis is available to help  Just call [phone])  The line is open 24 hours per day, 7 days per week  After a crisis  Mobile Crisis would like to help you develop ways to help reduce future crisis situations and create a crisis plan as part of your (or your child's, or your family's) recovery and wellness goals  [address] is provided by [employer], and includes the following services:  24 hour telephone counseling  Services provided in the individuals home  Assistance with developing strategies for reducing recurring crisis  Support for drug/alcohol use or addiction  Help coping with past traumatic experiences  Emergency respite  Peer support  Assistance connecting to local community resources  WHAT IF MOBILE CRISIS SUPPORTS ARE NOT ENOUGH?   Crisis Residential Services are short term residences for adults who are experiencing psychiatric crisis  If you support someone who requires emergency assistance due to imminent risk of harm to him/herself or others, please call [facility] at: [phone]  For TTY users, please call [phone]  The line is open 24 hours a day and seven days a week  [facility]                                         [phone]    [facility]                                     [facility]                                                              [phone]    [facility] Counseling Services                                           [phone]    Vinny Smithleonardo                                                                     [facility]                                                                          [phone]    Clubhouses   The clubhouse is a community site-based model of psychiatric rehabilitation in which participants are considered members, not consumers   Clubhouses provide their members with social, educational, and vocational experiences, including opportunities to work with staff as colleagues in the daily operations of the clubhouse  [facility] Clubhouse (by [facility]   9087, [address] [facility], [address] [facility]: [phone]   Hours M-F 9am-4pm  Shala Posadalorrie Clubhouse is a voluntary psychosocial rehabilitation program based on the original [employer] founded by [employer] in [facility]  The purpose of our Clubhouse is to promote recovery and instill hope among members with mental health challenges  The Clubhouse model features member leadership and involvement in all aspects of the program and offers restorative activities that focus on members strengths and abilities   The Clubhouse operates as a work-ordered day, which runs from 8:30 am - 4:00 pm, Monday - Friday  Members choose to work in one of three units:  1  Member Services: Focuses on building administrative skills, welcomes new members, and maintains records and statistics  2  Career Development: Focuses on return to school or work, offers tutoring, and publishes a monthly newsletter on member accomplishments  3  Health and Wellness: Prepares daily lunches, maintains the [employer], and manages exercise, recreation, and environment of Clubhouse  Members work side by side with staff as colleagues to run the program, to learn or teach skills, and to offer support and resources needed to achieve a satisfying and improved quality of life in the community  At the heart of the ClubCardioVIP model are four guarantees:  1  A right to a place to come  2  A right to meaningful relationships  3  A right to meaningful work  4  A right to a place to return  For more information about [employer], please call us at  or email [email or web address]     Housing Emergency    [address] [place] [address]                                                       [phone]        Find more resources at [place] [address]  66. com    Transportation Assistance   Multi-State Transit Technical Assistance Program (MTAP) help with transportation assistance to appointments   Radha Starks Data:  For questions about the program, please call [phone] Drug and Alcohol Resources     If you have health insurance, including medical assistance, there should be a phone number on your insurance card that you can call to find out how to access services  The card may say, For Kelechi Hernandez or For Drug and Alcohol Services or For Substance Abuse Services call the number provided   OR PA Get Help Now (4-320-147-HELP)    OFFICE OF DRUG & ALCOHOL  MISSION STATEMENT  The Office of Drug and Alcohol is committed to the prevention and treatment of substance abuse problems in [facility]  Services are delivered in partnership with qualified providers and are guided by a philosophy that embraces hope, respect, and support for recovery  [facility] Drug and Alcohol provides a wide range of drug and alcohol services to [facility] residents in the areas of Prevention, Intervention, Treatment, and Recovery Support  Prevention and intervention services are provided to the community at large regardless of personal income  Treatment services require a comprehensive clinical and financial assessment  Trained  perform these assessments in order to determine the most appropriate funding resource and level of care, and to find the provider best suited to meet an individuals treatment needs  Recovery support services are non-clinical services that assist individuals and their family members to recover from substance use disorders  These services are supplemental to a treatment program and focus on outreach, engagement, education and other strategies to assist individuals in engaging, maintaining and sustaining long-term recovery  Recovery Support Services are also available to family members of individuals in various stages of recovery, through the utilization of Certified Family  services and/or parent support groups  CONTACT US  [facility] of Drug & [address] [facility] [address]  Galen Palmer, [address] Phone: [phone] HOURS 8:00am - 4:30pm Monday - Friday    [address] [facility] [address] [facility] is a peer-operated center in [place], [place] that offers recovery support services, workshops and trainings for the recovery community   The [employer] is a project of [place] [employer], which is hosted by [facility] [employer], [address] [place] [address] for the center is through the [employer] of Drug and Alcohol and [employer]  In response to the COVID-19 pandemic and to continue to support our community,recovery support groups are currently being offered at the [place] and on Zoom  1:1 services are now being offered face-to-face in the community or at the center, while enforcing and following strict CDC guidelines  Telehealth also continues to be an option for those that prefer that form of support services  For more information on programming, contact Be Jones, Recovery Programs Specialist, at [email or web address]  org or 088-800-1226      Carina Kate meeting list can be found at [email or web address] [email or web address]-[email or web address]-line-meetings  NA meeting list can be found at [email or web address] [email or web address]-[email or web address]-line-meetings    The Following are Drug and Alcohol Outpatient Treatment Providers in [facility] and in network with your insurance   You may contact the providers to see if they are accepting new patients for suboxone MAT per your request  [facility]?  [facility], [address] [place] [address] Street: [place]   Phone: [phone]   Fax: [phone]  Languages: ENGLISH  Hours: Tuesday 08:00 AM-05:00   PM, Monday 12:00 AM-12:00   AM, Sunday 08:00 AM-05:00   PM, Saturday 08:00 AM-05:00   PM, Friday 08:00 AM-05:00 PM,   Thursday 08:00 AM-05:00 PM,   Wednesday 08:00 AM-05:00 PM   [address] [place] [address]?  [address] [place] [address]   Phone: [phone]   Fax: [phone]: ALISON Lopez  Hours: Sunday 08:00 AM-12:00   PM, Saturday 08:00 AM-12:00   PM, Friday 08:00 AM-04:00 PM,   Thursday 12:00 AM-12:00 AM,   Wednesday 12:00 AM-12:00 AM,   Tuesday 12:00 AM-12:00 AM,   Monday 12:00 AM-12:00 AM   [address]?  [address], [address] [facility] [place]   Phone: [phone]   Fax: [phone]  Languages: [place], SIGN LANGUAGE  Hours: Tuesday 08:00 AM-05:00   PM, Monday 08:00 AM-05:00   PM, Saturday 08:00 AM-05:00   PM, Friday 08:00 AM-05:00 PM,   Thursday 08:00 AM-05:00 PM,   Wednesday 08:00 AM-05:00 PM   [address] [facility] MAIN SITE?  [address] [facility], [address] [place], [place] [address]way: [place]   Phone: [phone]   Fax: [phone]: ENGLISH  Hours: Sunday 12:00 AM-12:00   AM, Saturday 12:00 AM-12:00   AM, Friday 12:00 AM-12:00 AM,   Thursday 12:00 AM-12:00 AM,   Wednesday 12:00 AM-12:00 AM,   Tuesday 12:00 AM-12:00 AM,   Monday 12:00 AM-12:00 AM     [address], [address]: Vim[place]aria estherLoyaltyLionles    Phone: [phone]   Fax: [phone]  Languages: ENGLISH  Hours: Monday 08:00 AM-05:00   PM, Friday 08:00 AM-05:00 PM,   Thursday 08:00 AM-05:00 PM,   Wednesday 08:00 AM-05:00 PM,   Tuesday 08:00 AM-05:00 PM   [address]?  [address]   [facility]: [address]   Phone: [phone]   Fax: [phone]  Languages: ENGLISH  Hours: Monday 08:30 AM-05:00   PM, Friday 08:30 AM-05:00 PM,   Thursday 08:30 AM-05:00 PM,   Wednesday 08:30 AM-05:00 PM,   Tuesday 08:30 AM-05:00 PM  [address]   [address]   [place], [address]   Phone: [phone]   Fax:  [phone]: Bogdan Erickson  [address] [place] [address]?  [address]: [address]   Phone: [phone]   Fax: [phone]  Languages: ENGLISH  Hours: Monday 12:00 AM-12:00   AM, Friday 12:00 AM-12:00 AM,   Thursday 12:00 AM-12:00 AM,   Wednesday 12:00 AM-12:00 AM,   Tuesday 12:00 AM-12:00 AM   Hours: Monday 09:00 AM-08:00   PM, Friday 09:00 AM-05:00 PM,   Thursday 09:00 AM-08:00 PM,   Wednesday 09:00 AM-08:00 PM,   Tuesday 09:00 AM-08:00 PM   [facility]?   [address] [place] [address] Sharona Thorpe [address]   Phone: [phone]   Fax: [phone]  Languages: ENGLISH   Hours: Call for availability   [facility]?  [address] [place] [address], [address] [place] [address] St: [place] [address]   Phone: [phone]   Fax: [phone]  Languages: ENGLISH   Hours: Call for availability

## 2021-07-08 NOTE — ED NOTES
Pt up and walking around room, yelling "SHUT THE F*CK UP" to noone  Pt reports voices are getting louder and would like ativan to calm him down  Pt denies homicidal/suicidal command hallucinations  Provider notified        Bryson Kirkpatrick RN  07/07/21 2112

## 2021-07-08 NOTE — ED NOTES
Pt becoming more agitated yelling loudly and more agressively at voices inside head  Pt denies suicidal thoughts or command hallucinations  Pt pacing around room and requests something more than ativan since "it is not doing sh*t"  Provider notified        Sebastian Lara RN  07/07/21 4737

## 2021-07-08 NOTE — H&P
[address] [facility] [place],Building 9 27 y o  male MRN: [id number]  Unit/Bed#: [facility] 256-01 Encounter: [id number]  REQUIRED DOCUMENTATION:     1  This service was provided via Telemedicine  2  Provider located at [facility]  3  TeleMed provider: Volodymyr Reis MD   4  Identify all parties in room with patient during tele consult:  RN  5  After connecting through televideo, patient was identified by name and date of birth  Parent/patient was then informed that this was being conducted confidentially over secure lines  My office door was closed  No one else was in the room  Patient acknowledged consent and understanding of privacy and security of the Telemedicine visit  I informed the patient that I have reviewed their record in Epic and presented the opportunity for them to ask any questions regarding the visit today  The patient agreed to participate  Chief Complaint:     History of Present Illness   Physician Requesting Consult: Aldair Muñoz*  Reason for Consult / Principal Problem: admission    Jack Hyde is a 27 y o  male presents with     Per Edvin Deshpande "Pt is a 27 y o  male who presented to the ED due to increased auditory command hallucinations, where the patient is hearing all sorts of voices telling him to kill himself", or that Rudean Bernard are going to kill him"  Patient reports that he has experienced auditory hallucinations for approximately 4 years, but that more recently, the voices have been more severe  Patient also states that the voices often tell him that he has no options but to kill himself  Patient denies suicidal ideation, as well as homicidal thoughts, although the voices led to him becoming aggressive towards his father  Patient reports a history of inpatient admissions, last at [facility], and is currently linked with [facility] for outpatient mental health treatment   Patient reports that he has been noncompliant with medications, adding that he stopped taking them because he did not feel that they were working  Patient denies visual hallucinations of any kind  Patient does admit to using IV methamphetamine daily, as recent as last night, and adds that he has been using for "few years"  Patient identified prior treatment for substance abuse at [facility], and 2 other facilities  Patient also admits that he has been using 8mg Suboxone, off the street, and recently began using it daily  Patient denies previous suicide attempts or self-injurious behaviors as well as any intentional harm to others "    Since admission he has been isolative to his room and poorly cooperative  On interview he is sedated from prn haldol and a limited historian but able to state he was in a "bad way" prior to coming in  Reports being sober from methamphetamines and when asked about recent use he said it was just once  Denies current AVH but is distracted and sedated  He asks about starting suboxone here, states he does not get it prescribed currently but wants to talk to someone about getting set up with that      Psychiatric Review Of Systems:  sleep: yes  appetite changes: no  weight changes: no  energy/anergy: yes  interest/pleasure/anhedonia: no  somatic symptoms: no  anxiety/panic: no  rosi: no  guilty/hopeless: yes  self injurious behavior/risky behavior: yes    Historical Information   Psychiatric History obtained from prior admission  Psychiatric medication trial: Risperidone, Bupropion, klonopin, buspar and trazodone  Inpatient hospitalizations: Yes more than 10  Suicide attempts: Denies  Violent behavior: Yes  Outpatient treatment: Yes    Substance Abuse History:  UDS + amphetamine, benzo  Family Psychiatric History:   Patient denies any known family history of psychiatric illness, suicide attempt, or substance abuse     Social History:  Education: high school diploma/GED  Learning Disabilities: denies  Marital history: single  Occupational History: unemployed  Functioning Relationships: good support system    Other Pertinent History: None        Traumatic History:   Abuse: none reported  Other Traumatic Events: none reported    Past Medical History:   Diagnosis Date    ADD (attention deficit disorder)     Depression     Drug abuse ([facility] )     Hallucination     Insomnia     Psychiatric illness     Schizophrenia ([facility] )        Medical Review Of Systems:  Review of Systems - Negative except HPI  Obtained from chart and patient  Meds/Allergies   all current active meds have been reviewed and current meds:   Current Facility-Administered Medications   Medication Dose Route Frequency    benztropine (COGENTIN) tablet 1 mg  1 mg Oral Q4H PRN Max 6/day    ibuprofen (MOTRIN) tablet 400 mg  400 mg Oral Q4H PRN    ibuprofen (MOTRIN) tablet 600 mg  600 mg Oral Q6H PRN    ibuprofen (MOTRIN) tablet 800 mg  800 mg Oral Q8H PRN    OLANZapine (ZyPREXA) tablet 10 mg  10 mg Oral Q3H PRN Max 3/day    Or    OLANZapine (ZyPREXA) IM injection 10 mg  10 mg Intramuscular Q3H PRN Max 3/day    OLANZapine (ZyPREXA) tablet 5 mg  5 mg Oral Q3H PRN Max 6/day    Or    OLANZapine (ZyPREXA) IM injection 5 mg  5 mg Intramuscular Q3H PRN Max 6/day    OLANZapine (ZyPREXA) tablet 2 5 mg  2 5 mg Oral Q3H PRN Max 8/day     No Known Allergies    Objective   Vital signs in last 24 hours:  Temp:  [96 5 °F (35 8 °C)-98 1 °F (36 7 °C)] 98 1 °F (36 7 °C)  HR:  [68-86] 68  Resp:  [14-18] 18  BP: ()/(51-76) 92/51    No intake or output data in the 24 hours ending 07/08/21 0814    Mental Status Evaluation:  Appearance:  age appropriate and bearded   Behavior:  psychomotor retardation   Speech:  soft and slow   Mood:  normal   Affect:  flat   Language: naming objects   Thought Process:  concrete   Associations: concrete associations   Thought Content:  normal   Perceptual Disturbances: denies but appears preoccupied   Risk Potential: Potential for Aggression Yes per HPI   Sensorium:  person, place and situation Memory:  recent and remote memory grossly intact   Cognition:  recent and remote memory grossly intact   Consciousness:  alert and awake    Attention: attention span appeared shorter than expected for age   Intellect: within normal limits   Fund of Knowledge: awareness of current events: fair   Insight:  limited   Judgment: limited   Muscle Strength and Tone: in bed   Gait/Station: in bed   Motor Activity: no abnormal movements     Vital signs in last 24 hours:  Temp:  [96 5 °F (35 8 °C)-98 1 °F (36 7 °C)] 98 1 °F (36 7 °C)  HR:  [68-86] 68  Resp:  [14-18] 18  BP: ()/(51-76) 92/51    Laboratory results:  I have personally reviewed all pertinent laboratory/tests results  Most Recent Labs:   Lab Results   Component Value Date    WBC 10 33 (H) 12/14/2016    RBC 4 86 12/14/2016    HGB 14 1 12/14/2016    HCT 39 2 12/14/2016     12/14/2016    RDW 13 2 12/14/2016    NEUTROABS 7 45 12/14/2016    SODIUM 139 12/14/2016    K 3 8 12/14/2016     12/14/2016    CO2 30 12/14/2016    BUN 14 10/10/2019    CREATININE 1 24 10/10/2019    GLUC 92 12/14/2016    CALCIUM 9 3 12/14/2016    AST 21 12/14/2016    ALT 30 12/14/2016    ALKPHOS 85 12/14/2016    TP 8 0 12/14/2016    ALB 4 2 12/14/2016    TBILI 0 80 12/14/2016        Code Status: )Level 1 - Full Code    Patient Strengths/Assets: patient is on a voluntary commitment     Patient Barriers/Limitations: substance abuse    Assessment/Plan     Assessment:  Jack Hyde is a 27 y o  male   Diagnosis:  Schizoaffective d/o  Methamphetamine use disorder  Plan:   1  Restart risperdal 1mg bid and titrate to effectiveness  2  Restart buspar 10mg bid  3  Klonopin verified on PDMP, will cont to avoid withdrawal  4  SW and CM services  Risks, benefits and possible side effects of Medications:   Risks, benefits, and possible side effects of medications explained to patient and patient verbalizes understanding       restart prior prescribed medications      Volodymyr Gram, MD
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
## 2021-07-08 NOTE — PROGRESS NOTES
Patient arrived to the unit with the following    [address]  underpants  Cell phone  Head phones  juliano Lewis ID  EBT  Visa [id number]

## 2021-07-08 NOTE — NURSING NOTE
Writer assessed pt's bilateral feet d/t open areas and scabbing  Pt reports having eczema and that feet often itch, pt does scratch at them and sometimes does pick at the scabbed area  States using hydrocortisone at home, refused in ED when offered  Denies having OP Dermatology  Open areas to Right foot, appears scabbed were scratched/picked at

## 2021-07-08 NOTE — ED NOTES
Patient is accepted at [facility]  Patient is accepted by Dr Cameron Guevara per Clive Claire in Intake  Transportation is arranged with [facility]  Transportation is scheduled for 0900  Patient may go to the floor at anytime  *Nurse report is to be called to [phone] prior to patient transfer  BAILEE Jimenes  07/07/21   8740

## 2021-07-08 NOTE — ASSESSMENT & PLAN NOTE
  No admission labs available    No labs pending   UDS positive for methamphetamine and benzodiazepine   COVID-19 negative   EKG 12/14/16 reveals NSR 91bpm,    Medically stable for continued inpatient psychiatric treatment

## 2021-07-08 NOTE — EMTALA/ACUTE CARE TRANSFER
[facility] EMERGENCY DEPARTMENT  [address]  [facility] [address]  Dept: [phone]      HBSSCE TRANSFER CONSENT    NAME Herman Maravilla                                         1990                              MRN [id number]    I have been informed of my rights regarding examination, treatment, and transfer   by Dr Mer De Los Santos DO    Benefits: Specialized equipment and/or services available at the receiving facility (Include comment)________________________    Risks: Potential for delay in receiving treatment      Consent for Transfer:  I acknowledge that my medical condition has been evaluated and explained to me by the emergency department physician or other qualified medical person and/or my attending physician, who has recommended that I be transferred to the service of  Accepting Physician: Dr Troy Robbins at [address] [facility] Name, [address] : [address] [place] [facility]  The above potential benefits of such transfer, the potential risks associated with such transfer, and the probable risks of not being transferred have been explained to me, and I fully understand them  The doctor has explained that, in my case, the benefits of transfer outweigh the risks  I agree to be transferred  I authorize the performance of emergency medical procedures and treatments upon me in both transit and upon arrival at the receiving facility  Additionally, I authorize the release of any and all medical records to the receiving facility and request they be transported with me, if possible  I understand that the safest mode of transportation during a medical emergency is an ambulance and that the Hospital advocates the use of this mode of transport  Risks of traveling to the receiving facility by car, including absence of medical control, life sustaining equipment, such as oxygen, and medical personnel has been explained to me and I fully understand them      ([address] [place])  [  ] I consent to the stated transfer and to be transported by ambulance/helicopter  [  ]  I consent to the stated transfer, but refuse transportation by ambulance and accept full responsibility for my transportation by car  I understand the risks of non-ambulance transfers and I exonerate the Hospital and its staff from any deterioration in my condition that results from this refusal     X___________________________________________    DATE  21  TIME________  Signature of patient or legally responsible individual signing on patient behalf           RELATIONSHIP TO PATIENT_________________________          Provider Certification    NAME Ralph Clarke                                         1990                              MRN [id number]    A medical screening exam was performed on the above named patient  Based on the examination:    Condition Necessitating Transfer The primary encounter diagnosis was Schizoaffective disorder, unspecified type ([facility] )  Diagnoses of Schizophrenia ([facility] ) and Suicidal ideation were also pertinent to this visit      Patient Condition: The patient has been stabilized such that within reasonable medical probability, no material deterioration of the patient condition or the condition of the unborn child(tarun) is likely to result from the transfer    Reason for Transfer: Level of Care needed not available at this facility    Transfer Requirements: [facility]   · Space available and qualified personnel available for treatment as acknowledged by crisis  · Agreed to accept transfer and to provide appropriate medical treatment as acknowledged by       Dr Nina Epstein  · Appropriate medical records of the examination and treatment of the patient are provided at the time of transfer   [address] _______  · Transfer will be performed by qualified personnel from [facility]  and appropriate transfer equipment as required, including the use of necessary and appropriate life support measures  Provider Certification: I have examined the patient and explained the following risks and benefits of being transferred/refusing transfer to the patient/family:  General risk, such as traffic hazards, adverse weather conditions, rough terrain or turbulence, possible failure of equipment (including vehicle or aircraft), or consequences of actions of persons outside the control of the transport personnel, Unanticipated needs of medical equipment and personnel during transport      Based on these reasonable risks and benefits to the patient and/or the unborn child(tarun), and based upon the information available at the time of the patients examination, I certify that the medical benefits reasonably to be expected from the provision of appropriate medical treatments at another medical facility outweigh the increasing risks, if any, to the individuals medical condition, and in the case of labor to the unborn child, from effecting the transfer      X____________________________________________ DATE 07/07/21        TIME_______      ORIGINAL - SEND TO MEDICAL RECORDS   COPY - SEND WITH PATIENT DURING TRANSFER

## 2021-07-08 NOTE — ED NOTES
201 faxed to [facility] for review at [facility] BAILEE Boudreaux  07/07/21 2027

## 2021-07-08 NOTE — TREATMENT PLAN
TREATMENT PLAN REVIEW - [address] 31 y o  1990 male MRN: [id number]    [address] [facility] Room / Bed: [facility] 256/[facility] 267-32 Encounter: [id number]          Admit Date/Time:  7/8/2021  1:36 AM    Treatment Team: Attending Provider: Gordon Mendoza MD; Patient Care Technician: Ezra Meneses; Physician Assistant: Ingrid Thacker; Care Manager: Blossom Groves, UBCK; Patient Care Technician: Deyanira Brooke; Licensed Practical Nurse: Sammi Garber LPN; Registered Nurse: Renae Hopson, BUCK; Security: Mirian Cranker; Registered Nurse: Lin Stiles RN; : Shayy Lala; Occupational Therapy Assistant: VIKI Vasques;  Registered Nurse: Mir Chatman RN    Diagnosis: Principal Problem:    Schizoaffective disorder ([facility] )  Active Problems:    Methamphetamine abuse ([facility] )      Patient Strengths/Assets: good past treatment response    Patient Barriers/Limitations: substance abuse    Short Term Goals: decrease in depressive symptoms, decrease in psychotic symptoms    Long Term Goals: improvement in depression, improvement in reality testing, improvement in reasoning ability, acceptance of need for psychiatric follow up after discharge    Progress Towards Goals: continue psychiatric medications as prescribed    Recommended Treatment: medication management, patient medication education, group therapy, milieu therapy, continued Behavioral Health psychiatric evaluation/assessment process    Treatment Frequency: daily medication monitoring, group and milieu therapy daily, monitoring through interdisciplinary rounds, monitoring through weekly patient care conferences    Expected Discharge Date:  5 days    Discharge Plan: referrals as indicated    Treatment Plan Created/Updated By: Bari Siddiqui MD

## 2021-07-08 NOTE — ED NOTES
Pt sleeping in stretcher with even rise/fall of chest, no outward signs of distress        Jake Brandt RN  07/08/21 2508

## 2021-07-08 NOTE — ED NOTES
RN spoke to pt's mother on phone about pt placement      Clotilde Weaver RN  07/07/21 2045       Clotilde Weaver RN  07/07/21 2050

## 2021-07-08 NOTE — NURSING NOTE
Patient in bed all morning, scant in conversation  Denies SI, CFS but reports needing BZO's or he will seize  Writer asked pt if he has hx of SZ, he reported no, but is fearful he could have one while here  Pt reassured the provider would be notified of this  He requested to continue sleeping due to the PRN's he got last evening, making him tired today

## 2021-07-09 VITALS
HEIGHT: 72 IN | OXYGEN SATURATION: 97 % | DIASTOLIC BLOOD PRESSURE: 66 MMHG | SYSTOLIC BLOOD PRESSURE: 108 MMHG | RESPIRATION RATE: 18 BRPM | BODY MASS INDEX: 22.13 KG/M2 | HEART RATE: 72 BPM | TEMPERATURE: 95.8 F | WEIGHT: 163.4 LBS

## 2021-07-09 PROBLEM — F25.9 SCHIZOAFFECTIVE DISORDER (HCC): Status: RESOLVED | Noted: 2019-10-01 | Resolved: 2021-07-09

## 2021-07-09 PROBLEM — Z00.8 MEDICAL CLEARANCE FOR PSYCHIATRIC ADMISSION: Status: RESOLVED | Noted: 2021-02-09 | Resolved: 2021-07-09

## 2021-07-09 PROCEDURE — 99238 HOSP IP/OBS DSCHRG MGMT 30/<: CPT | Performed by: NURSE PRACTITIONER

## 2021-07-09 RX ORDER — RISPERIDONE 1 MG/1
1 TABLET, FILM COATED ORAL 2 TIMES DAILY
Qty: 60 TABLET | Refills: 1 | Status: SHIPPED | OUTPATIENT
Start: 2021-07-09 | End: 2021-09-07

## 2021-07-09 RX ORDER — BUSPIRONE HYDROCHLORIDE 10 MG/1
10 TABLET ORAL 2 TIMES DAILY
Qty: 60 TABLET | Refills: 1 | Status: SHIPPED | OUTPATIENT
Start: 2021-07-09 | End: 2021-09-07

## 2021-07-09 RX ADMIN — NICOTINE POLACRILEX 4 MG: 4 GUM, CHEWING ORAL at 10:51

## 2021-07-09 RX ADMIN — CLONAZEPAM 1 MG: 1 TABLET ORAL at 07:52

## 2021-07-09 RX ADMIN — NICOTINE POLACRILEX 4 MG: 4 GUM, CHEWING ORAL at 13:00

## 2021-07-09 RX ADMIN — RISPERIDONE 1 MG: 1 TABLET ORAL at 08:50

## 2021-07-09 RX ADMIN — NICOTINE POLACRILEX 4 MG: 4 GUM, CHEWING ORAL at 08:51

## 2021-07-09 RX ADMIN — BUSPIRONE HYDROCHLORIDE 10 MG: 10 TABLET ORAL at 08:50

## 2021-07-09 NOTE — PLAN OF CARE
Problem: Alteration in Thoughts and Perception  Goal: Verbalize thoughts and feelings  Description: Interventions:  - Promote a nonjudgmental and trusting relationship with the patient through active listening and therapeutic communication  - Assess patient's level of functioning, behavior and potential for risk  - Engage patient in 1 on 1 interactions  - Encourage patient to express fears, feelings, frustrations, and discuss symptoms    - [place] patient to reality, help patient recognize reality-based thinking   - Administer medications as ordered and assess for potential side effects  - Provide the patient education related to the signs and symptoms of the illness and desired effects of prescribed medications  Outcome: Progressing  Goal: Refrain from acting on delusional thinking/internal stimuli  Description: Interventions:  - Monitor patient closely, per order   - Utilize least restrictive measures   - Set reasonable limits, give positive feedback for acceptable   - Administer medications as ordered and monitor of potential side effects  Outcome: Progressing  Goal: Agree to be compliant with medication regime, as prescribed and report medication side effects  Description: Interventions:  - Offer appropriate PRN medication and supervise ingestion; conduct AIMS, as needed   Outcome: Progressing     Problem: Risk for Violence/Aggression Toward Others  Goal: Treatment Goal: Refrain from acts of violence/aggression during length of stay, and demonstrate improved impulse control at the time of discharge  Outcome: Progressing  Goal: Refrain from harming others  Outcome: Progressing  Goal: Refrain from destructive acts on the environment or property  Outcome: Progressing  Goal: Control angry outbursts  Description: Interventions:  - Monitor patient closely, per order  - Ensure early verbal de-escalation  - Monitor prn medication needs  - Set reasonable/therapeutic limits, outline behavioral expectations, and consequences   - Provide a non-threatening milieu, utilizing the least restrictive interventions   Outcome: Progressing     Problem: SELF HARM/SUICIDALITY  Goal: Will have no self-injury during hospital stay  Description: INTERVENTIONS:  - Q 15 MINUTES: Routine safety checks  - Q WAKING SHIFT & PRN: Assess risk to determine if routine checks are adequate to maintain patient safety  - Encourage patient to participate actively in care by formulating a plan to combat response to suicidal ideation, identify supports and resources  Outcome: Progressing     Problem: SLEEP DISTURBANCE  Goal: Will exhibit normal sleeping pattern  Description: Interventions:  -  Assess the patients sleep pattern, noting recent changes  - Administer medication as ordered  - Decrease environmental stimuli, including noise, as appropriate during the night  - Encourage the patient to actively participate in unit groups and or exercise during the day to enhance ability to achieve adequate sleep at night  - Assess the patient, in the morning, encouraging a description of sleep experience  Outcome: Progressing

## 2021-07-09 NOTE — NURSING NOTE
Patient denies SI/HI at this time  Patient reports previously hearing auditory hallucinations stating that they were there to torture him, but does not hear them at this time  Effective coping skills were discussed and the patient was reassured that they are in a safe environment  The patient denies anxiety and depression at this time but displays a flat affect  The patient has no questions or concerns at this time

## 2021-07-09 NOTE — NURSING NOTE
AVS reviewed with Pt  Pt verbalized understanding and had no questions/concerns  Expressed readiness to discharge  Left unit with RN and left hospital with father

## 2021-07-09 NOTE — PROGRESS NOTES
07/09/21 1119   Team Meeting   Meeting Type Daily Rounds   Team Members Present   Team Members Present Physician;Nurse;;Occupational Therapist   Physician Team Member Dr Barajas/PAANNA [address] Team Member Xiomara Rock 28  Management Team Member Nahomy   OT Team Member Ninoska   Patient/Family Present   Patient Present No   Patient's Family Present No   Daily Rounds Note Report- seclusive to room   Irritable edge during day, denies SI, focused on benzos, took Klonapin PRN x2 yesterday, BP low, signed 72 7/8, d/c today

## 2021-07-09 NOTE — NURSING NOTE
Pt reported feeling "really anxious"  Pt pacing at medication room  Pt received Klonopin 1 mg po for anxiety @0752  Rebolledo score 26  Upon follow up, pt reported "It still hasn't really worked yet but I am going to try to relax and nap"  Pt no longer pacing and appears visibly calmer  Medication moderately effective

## 2021-07-09 NOTE — BH TRANSITION RECORD
Contact Information: If you have any questions, concerns, pended studies, tests and/or procedures, or emergencies regarding your inpatient behavioral health visit  Please contact [place] [facility] unit [phone] and ask to speak to a , nurse or physician  A contact is available 24 hours/ 7 days a week at this number  Summary of Procedures Performed During your Stay:  Below is a list of major procedures performed during your hospital stay and a summary of results:  - Cardiac Procedures/Studies: EKG  Pending Studies (From admission, onward)    None        If studies are pending at discharge, follow up with your PCP and/or referring provider

## 2021-07-09 NOTE — PLAN OF CARE
Problem: Alteration in Thoughts and Perception  Goal: Treatment Goal: Gain control of psychotic behaviors/thinking, reduce/eliminate presenting symptoms and demonstrate improved reality functioning upon discharge  7/9/2021 1233 by Deep Sommers RN  Outcome: Adequate for Discharge  7/9/2021 1233 by Deep Sommers RN  Outcome: Progressing  Goal: Verbalize thoughts and feelings  Description: Interventions:  - Promote a nonjudgmental and trusting relationship with the patient through active listening and therapeutic communication  - Assess patient's level of functioning, behavior and potential for risk  - Engage patient in 1 on 1 interactions  - Encourage patient to express fears, feelings, frustrations, and discuss symptoms    - [place] patient to reality, help patient recognize reality-based thinking   - Administer medications as ordered and assess for potential side effects  - Provide the patient education related to the signs and symptoms of the illness and desired effects of prescribed medications  7/9/2021 1233 by Deep Sommers RN  Outcome: Adequate for Discharge  7/9/2021 1233 by Deep Sommers RN  Outcome: Progressing  Goal: Refrain from acting on delusional thinking/internal stimuli  Description: Interventions:  - Monitor patient closely, per order   - Utilize least restrictive measures   - Set reasonable limits, give positive feedback for acceptable   - Administer medications as ordered and monitor of potential side effects  7/9/2021 1233 by Deep Sommers RN  Outcome: Adequate for Discharge  7/9/2021 1233 by Deep Sommers RN  Outcome: Progressing  Goal: Agree to be compliant with medication regime, as prescribed and report medication side effects  Description: Interventions:  - Offer appropriate PRN medication and supervise ingestion; conduct AIMS, as needed   7/9/2021 1233 by Deep Sommers RN  Outcome: Adequate for Discharge  7/9/2021 1233 by Melina Quinonez Kristen Burch RN  Outcome: Progressing  Goal: Recognize dysfunctional thoughts, communicate reality-based thoughts at the time of discharge  Description: Interventions:  - Provide medication and psycho-education to assist patient in compliance and developing insight into his/her illness   7/9/2021 1233 by Millicent Barragan RN  Outcome: Adequate for Discharge  7/9/2021 1233 by Millicent Barragan RN  Outcome: Progressing  Goal: Complete daily ADLs, including personal hygiene independently, as able  Description: Interventions:  - Observe, teach, and assist patient with ADLS  - Monitor and promote a balance of rest/activity, with adequate nutrition and elimination   7/9/2021 1233 by Millicent Barragan RN  Outcome: Adequate for Discharge  7/9/2021 1233 by Millicent Barragan RN  Outcome: Progressing     Problem: Ineffective Coping  Goal: Participates in unit activities  Description: Interventions:  - Provide therapeutic environment   - Provide required programming   - Redirect inappropriate behaviors   7/9/2021 1233 by Millicent Barragan RN  Outcome: Adequate for Discharge  7/9/2021 1233 by Millicent Barragan RN  Outcome: Progressing     Problem: Risk for Violence/Aggression Toward Others  Goal: Treatment Goal: Refrain from acts of violence/aggression during length of stay, and demonstrate improved impulse control at the time of discharge  7/9/2021 1233 by Millicent Barragan RN  Outcome: Adequate for Discharge  7/9/2021 1233 by Millicent Barragan RN  Outcome: Progressing  Goal: Refrain from harming others  7/9/2021 1233 by Millicent Barragan RN  Outcome: Adequate for Discharge  7/9/2021 1233 by Millicent Barragan RN  Outcome: Progressing  Goal: Refrain from destructive acts on the environment or property  7/9/2021 1233 by Millicent Barragan RN  Outcome: Adequate for Discharge  7/9/2021 1233 by Millicent Barragan RN  Outcome: Progressing  Goal: Control angry outbursts  Description: Interventions:  - Monitor patient closely, per order  - Ensure early verbal de-escalation  - Monitor prn medication needs  - Set reasonable/therapeutic limits, outline behavioral expectations, and consequences   - Provide a non-threatening milieu, utilizing the least restrictive interventions   7/9/2021 1233 by Luan Sheth RN  Outcome: Adequate for Discharge  7/9/2021 1233 by Luan Sheth RN  Outcome: Progressing     Problem: SELF HARM/SUICIDALITY  Goal: Will have no self-injury during hospital stay  Description: INTERVENTIONS:  - Q 15 MINUTES: Routine safety checks  - Q WAKING SHIFT & PRN: Assess risk to determine if routine checks are adequate to maintain patient safety  - Encourage patient to participate actively in care by formulating a plan to combat response to suicidal ideation, identify supports and resources  7/9/2021 1233 by Luan Sheth RN  Outcome: Adequate for Discharge  7/9/2021 1233 by Luna Sheth RN  Outcome: Progressing     Problem: SLEEP DISTURBANCE  Goal: Will exhibit normal sleeping pattern  Description: Interventions:  -  Assess the patients sleep pattern, noting recent changes  - Administer medication as ordered  - Decrease environmental stimuli, including noise, as appropriate during the night  - Encourage the patient to actively participate in unit groups and or exercise during the day to enhance ability to achieve adequate sleep at night  - Assess the patient, in the morning, encouraging a description of sleep experience  7/9/2021 1233 by Luan Sheth RN  Outcome: Adequate for Discharge  7/9/2021 1233 by Luan Sheth RN  Outcome: Progressing     Problem: DISCHARGE PLANNING  Goal: Discharge to home or other facility with appropriate resources  Description: INTERVENTIONS:  - Identify barriers to discharge w/patient and caregiver  - Arrange for needed discharge resources and transportation as appropriate  - Identify discharge learning needs (meds, wound care, etc )  - Arrange for interpretive services to assist at discharge as needed  - Refer to Case Management Department for coordinating discharge planning if the patient needs post-hospital services based on physician/advanced practitioner order or complex needs related to functional status, cognitive ability, or social support system  7/9/2021 1233 by Summer Keenan RN  Outcome: Adequate for Discharge  7/9/2021 1233 by Summer Keenan RN  Outcome: Progressing

## 2021-07-09 NOTE — DISCHARGE SUMMARY
Discharge Summary - [address] 27 y o  male MRN: [id number]  Unit/Bed#: -01 Encounter: [id number]     Admission Date: 7/8/2021         Discharge Date: 07/09/21    Attending Psychiatrist: Memory Yeny*    Reason for Admission/HPI:     Copied from HPI on Admission  "Devante Davidson is a 27 y o  male presents with      Per Era Galarza "Pt is a 26 y  o  male who presented to the ED due to increased auditory command hallucinations, where the patient is hearing all sorts of voices telling him to kill himself", or that Mayme Messing are going to kill him"   Patient reports that he has experienced auditory hallucinations for approximately 4 years, but that more recently, the voices have been more severe   Patient also states that the voices often tell him that he has no options but to kill himself   Patient denies suicidal ideation, as well as homicidal thoughts, although the voices led to him becoming aggressive towards his father   Patient reports a history of inpatient admissions, last at [facility], and is currently linked with [facility] for outpatient mental health treatment  Patient reports that he has been noncompliant with medications, adding that he stopped taking them because he did not feel that they were working  Patient denies visual hallucinations of any kind        Patient does admit to using IV methamphetamine daily, as recent as last night, and adds that he has been using for "few years"   Patient identified prior treatment for substance abuse [facility], and 2 other facilities  German Gary also admits that he has been using 8mg Suboxone, off the street, and recently began using it daily  Mayme Messing denies previous suicide attempts or self-injurious behaviors as well as any intentional harm to others "    Since admission he has been isolative to his room and poorly cooperative   On interview he is sedated from prn haldol and a limited historian but able to state he was in a "bad way" prior to coming in  Reports being sober from methamphetamines and when asked about recent use he said it was just once  Denies current AVH but is distracted and sedated       He asks about starting suboxone here, states he does not get it prescribed currently but wants to talk to someone about getting set up with that "      Meds/Allergies     all current active meds have been reviewed    No Known Allergies    Objective     Vital signs in last 24 hours:  Temp:  [95 8 °F (35 4 °C)-97 9 °F (36 6 °C)] 95 8 °F (35 4 °C)  HR:  [62-75] 72  Resp:  [16-18] 18  BP: (104-109)/(62-66) 108/66    No intake or output data in the 24 hours ending 07/09/21 [address] [facility] Course: The patient was admitted to the inpatient psychiatric unit and started on every 15 minutes precautions  During the hospitalization the patient was attending individual therapy, group therapy, milieu therapy and occupational therapy  Psychiatric medications were titrated over the hospital stay  To address mood instability and psychotic symptoms the patient was started on antipsychotic medication Risperdal and anxiolytic medication Buspar  Medication doses were titrated during the hospital course  Prior to beginning of treatment medications risks and benefits and possible side effects including risk of suicidality, serotonin syndrome and SIADH related to treatment with antidepressants; Risk of induction of manic symptoms in certain patient populations and risk of parkinsonian symptoms, metabolic syndrome, tardive dyskinesia and neuroleptic malignant syndrome related to treatment with antipsychotic medications were reviewed with the patient  The patient verbalized understanding and agreement for treatment  Patient's symptoms improved gradually over the hospital course  At the end of treatment the patient was doing well  Mood was stable at the time of discharge   The patient denied suicidal ideation, intent or plan at the time of discharge and denied homicidal ideation, intent or plan at the time of discharge  There was no overt psychosis at the time of discharge  Sleep and appetite were improved  The patient was tolerating medications and was not reporting any significant side effects at the time of discharge  Since the patient was doing well at the end of the hospitalization, treatment team felt that the patient could be safely discharged to outpatient care  The outpatient follow up with psychiatry and a  therapist was arranged by the unit  upon discharge      Mental Status at Time of Discharge:     Appearance Adequate hygiene and grooming   Behavior calm and cooperative   Mood euthymic   Speech Normal rate and volume   Affect constricted   Thought Processes Goal directed and coherent   Thought Content Does not verbalize delusional material   Associations Tightly connected   Perceptual Disturbances Denies hallucinations and does not appear to be responding to internal stimuli   Risk Potential Suicidal/Homicidal Ideation - No evidence of suicidal or homicidal ideation and patient does not verbalize suicidal or homicidal ideation  Risk of Violence - No evidence of risk for violence found on assessment  Risk of Self Mutilation - No evidence of risk for self mutilation found on assessment   Orientation oriented to person, place, time/date and situation   Memory recent and remote memory grossly intact   Consciousness alert and awake   Attention/Concentration attention span and concentration are age appropriate   Insight intact   Judgement intact   Muscle Strength and Gait normal muscle strength and normal muscle tone, normal gait/station and normal balance   Motor Activity no abnormal movements     Admission Diagnosis:  Principal Problem:    Schizoaffective disorder ([place] [id number] )  Active Problems:    Methamphetamine abuse [facility])      Discharge Diagnosis:     Principal Problem:    Schizoaffective disorder ([place] [id number] )  Active Problems:    Methamphetamine abuse [facility])  Resolved Problems:    Medical clearance for psychiatric admission      Lab results:    No visits with results within 1 Day(s) from this visit  Latest known visit with results is:   Admission on 07/07/2021, Discharged on 07/08/2021   Component Date Value    Amph/Meth UR 07/07/2021 Positive*    Barbiturate Ur 07/07/2021 Negative     Benzodiazepine Urine 07/07/2021 Positive*    Cocaine Urine 07/07/2021 Negative     Methadone Urine 07/07/2021 Negative     Opiate Urine 07/07/2021 Negative     PCP Ur 07/07/2021 Negative     THC Urine 07/07/2021 Negative     Oxycodone Urine 07/07/2021 Negative     EXTBreath Alcohol 07/07/2021 0 000     SARS-CoV-2 07/07/2021 Negative     Ventricular Rate 07/07/2021 74     Atrial Rate 07/07/2021 74     TN Interval 07/07/2021 126     QRSD Interval 07/07/2021 96     QT Interval 07/07/2021 372     QTC Interval 07/07/2021 412     P Axis 07/07/2021 60     QRS Axis 07/07/2021 63     T Wave Axis 07/07/2021 53        Discharge Medications:    See after visit summary for reconciled discharge medications provided to patient and family  Discharge instructions/Information to patient and family:     See after visit summary for information provided to patient and family  Provisions for Follow-Up Care:    See after visit summary for information related to follow-up care and any pertinent home health orders  Discharge Statement     I spent 30 minutes discharging the patient  This time was spent on the day of discharge  I had direct contact with the patient on the day of discharge  Additional documentation is required if more than 30 minutes were spent on discharge:    I reviewed with [place] Peers importance of compliance with medications and outpatient treatment after discharge

## 2021-07-09 NOTE — PROGRESS NOTES
07/09/21 1121   Team Meeting   Meeting Type Tx Team Meeting   Team Members Present   Team Members Present Physician;Nurse;   Physician Team Member Dr Garry Templeton Team Member [facility] Management Team Member Angela Rosenbaum   Patient/Family Present   Patient Present No   Patient's Family Present No   CM approached pt to see if he was interested in reviewing tx plan with tx team, pt declined  Tx team reviewed tx plan  All parties signed tx plan   Tx plan placed in pt d/c folder

## 2021-07-09 NOTE — CASE MANAGEMENT
Pt signed 72hr notice last evening will have to d/c today  CM met with pt pt stated he is not willing to rescind and stay through the weekend  CM reviewed pt's OP appointments  CM informed pt that Dr Giovanni Garcia no longer works with his insurance, offered to locate another provider for pt and schedule pt declined stated he would like resources and to set up appointment on his own  Pt asked CM to contact PO and make aware of his d/c and interest in starting a MAT program  Pt signed BREANN for [address] [place] [address] [phone], CM contacted and LVM notifying  Pt stated parents will p/u, CM contacted Mom Yash Harris and LVM to review d/c plans and coordinate p/u time requested c/b  CM contacted [id number] [phone] and made aware of d/c

## 2021-07-09 NOTE — NURSING NOTE
Pt has been sleeping throuhgout shift, scant with writer  Reports being hungry and just awaiting snack  No group this evening, states AH are still present, however sleeping helps pt to not listen   Denies SI, HI